# Patient Record
Sex: FEMALE | Race: WHITE | NOT HISPANIC OR LATINO | Employment: FULL TIME | URBAN - METROPOLITAN AREA
[De-identification: names, ages, dates, MRNs, and addresses within clinical notes are randomized per-mention and may not be internally consistent; named-entity substitution may affect disease eponyms.]

---

## 2023-08-22 ENCOUNTER — TELEPHONE (OUTPATIENT)
Dept: OBGYN CLINIC | Facility: MEDICAL CENTER | Age: 33
End: 2023-08-22

## 2023-08-22 ENCOUNTER — TELEPHONE (OUTPATIENT)
Facility: HOSPITAL | Age: 33
End: 2023-08-22

## 2023-08-22 DIAGNOSIS — N92.6 MISSED MENSES: Primary | ICD-10-CM

## 2023-08-22 NOTE — TELEPHONE ENCOUNTER
PT called and LVM at [facility] office. Stated she was transferred from radiology and needed to schedule an appt. Verified PT needed to be scheduled w/radiology. Spoke w/Nithya at central scheduling and she stated she would call PT to schedule.

## 2023-09-06 ENCOUNTER — HOSPITAL ENCOUNTER (OUTPATIENT)
Dept: RADIOLOGY | Age: 33
Discharge: HOME | End: 2023-09-06
Payer: COMMERCIAL

## 2023-09-06 ENCOUNTER — TELEPHONE (OUTPATIENT)
Dept: OBGYN CLINIC | Facility: CLINIC | Age: 33
End: 2023-09-06

## 2023-09-06 DIAGNOSIS — N92.6 MISSED MENSES: ICD-10-CM

## 2023-09-06 DIAGNOSIS — Z34.01 ENCOUNTER FOR SUPERVISION OF NORMAL FIRST PREGNANCY IN FIRST TRIMESTER: Primary | ICD-10-CM

## 2023-09-06 PROCEDURE — 76801 OB US < 14 WKS SINGLE FETUS: CPT

## 2023-09-07 ENCOUNTER — INITIAL PRENATAL (OUTPATIENT)
Dept: OBGYN CLINIC | Facility: CLINIC | Age: 33
End: 2023-09-07

## 2023-09-07 VITALS
WEIGHT: 193.8 LBS | BODY MASS INDEX: 26.25 KG/M2 | DIASTOLIC BLOOD PRESSURE: 62 MMHG | HEIGHT: 72 IN | SYSTOLIC BLOOD PRESSURE: 122 MMHG

## 2023-09-07 DIAGNOSIS — Z34.01 ENCOUNTER FOR SUPERVISION OF NORMAL FIRST PREGNANCY IN FIRST TRIMESTER: Primary | ICD-10-CM

## 2023-09-07 PROCEDURE — OBC

## 2023-09-07 NOTE — PROGRESS NOTES
OB History    Para Term  AB Living   1 0 0 0 0 0   SAB IAB Ectopic Multiple Live Births   0 0 0 0 0      # Outcome Date GA Lbr Erick/2nd Weight Sex Delivery Anes PTL Lv   1 Current                  * Pt presents for OB intake  *  *Pt's LMP was Patient's last menstrual period was 2023. *Ultrasound date:2023   8weeks 6days  *Estimated date of delivery: 2023   * confirmed by lmp    *Signs/Symptoms of Pregnancy   *yes Constipation    *no Headaches   *yes Cramping  *no Spotting   *yes Nausea - mild     Diabetes  If any of the following answers are  yes, please order 1 hour GTT, 50g   *no Hx of GDM    *no BMI >35    *no First degree relative with type 2 diabetes    *no Hx of PCOS   *no Current metformin use    *no Prior hx of LGA/macrosomia    *no AMA with other risk factors    Hypertension-    *no Hx of chronic HTN    *no Hx of gestational HTN   *no hx of preeclampsia, eclampsia, or HELLP syndrome     *Infection Screening   *no Does the pt have a hx of MRSA?    *if yes- follow MRSA protocol and obtain a nasal swab for MRSA culture   *yes History of herpes?      - will need Valtrex at 36 weeks    *Immunizations:   *yes Discussed influenza vaccine     Not flu season   *yes Discussed TDaP vaccine   *no COVID Vaccine     - declines    ACTIVE MEDICAL/MENTAL HEALTH CONDITIONS  Depression *no   Anxiety*no  Medications*no    *Interview education   *Handouts given:    *[facility] and Me support center     *MyChart sign up instructions    *Lab Locations    *[facility] Pediatricians List/Choosing Pediatrician Sheet    * [employer] [facility] Classes    *Schedule for Prenatal Visits    *Pregnancy Warning Signs Reviewed    *Safe Medications During Pregnancy    *SMA and CF Testing information sheet    *CPT Code Sheet/MFM 13week NT pamphlet    *[employer]      SBIRT Screen:  Depression Screening Follow-up Plan: Patient's depression screening was negative with an Dundee score of 0        *[facility]   *yes discussed genetic testing- pt interested     Patient has been informed of basic prenatal advice such as avoiding alcohol, drugs, and smoking. She should remain hydrated and take daily prenatal vitamins. Patient should avoid caffeine, raw sprouts, high mercury fish, undercooked fish, raw eggs, organ meat, unwashed produce, and unpasteurized cheeses, milk, and fruit juice and undercooked meats. She has been informed about toxoplasmosis and to avoid cat feces. *Details that I feel the provider should be aware of:  Tracy Mckeon presented today for initial ob intake at [facility]. She has complaints of mild nausea and constipation. Reviewed medications she can take during pregnancy. Reviewed warning signs and when to call the office. Prenatal labs, TSH ordered. [facility] referral placed. PN1 visit scheduled for *10/05/2023. The patient was oriented to our practice and all questions were answered.     Interviewed by:  Camilo Faulkner RN

## 2023-10-01 DIAGNOSIS — E07.9 DISEASE OF THYROID GLAND: ICD-10-CM

## 2023-10-03 RX ORDER — LEVOTHYROXINE SODIUM 0.07 MG/1
75 TABLET ORAL EVERY MORNING
Qty: 90 TABLET | Refills: 1 | Status: SHIPPED | OUTPATIENT
Start: 2023-10-03

## 2023-10-04 ENCOUNTER — ROUTINE PRENATAL (OUTPATIENT)
Dept: PERINATAL CARE | Facility: CLINIC | Age: 33
End: 2023-10-04
Payer: COMMERCIAL

## 2023-10-04 VITALS
WEIGHT: 197.8 LBS | BODY MASS INDEX: 27.69 KG/M2 | SYSTOLIC BLOOD PRESSURE: 118 MMHG | HEART RATE: 89 BPM | HEIGHT: 71 IN | DIASTOLIC BLOOD PRESSURE: 80 MMHG

## 2023-10-04 DIAGNOSIS — Z3A.12 12 WEEKS GESTATION OF PREGNANCY: ICD-10-CM

## 2023-10-04 DIAGNOSIS — Z34.01 ENCOUNTER FOR SUPERVISION OF NORMAL FIRST PREGNANCY IN FIRST TRIMESTER: ICD-10-CM

## 2023-10-04 DIAGNOSIS — Z36.82 ENCOUNTER FOR (NT) NUCHAL TRANSLUCENCY SCAN: Primary | ICD-10-CM

## 2023-10-04 PROBLEM — U07.1 REINFECTION BY COVID-19 VIRUS: Status: RESOLVED | Noted: 2022-03-08 | Resolved: 2023-10-04

## 2023-10-04 PROBLEM — Z86.16 PERSONAL HISTORY OF COVID-19: Status: RESOLVED | Noted: 2022-01-14 | Resolved: 2023-10-04

## 2023-10-04 PROCEDURE — 76813 OB US NUCHAL MEAS 1 GEST: CPT | Performed by: OBSTETRICS & GYNECOLOGY

## 2023-10-04 PROCEDURE — 99203 OFFICE O/P NEW LOW 30 MIN: CPT | Performed by: OBSTETRICS & GYNECOLOGY

## 2023-10-04 RX ORDER — ASPIRIN 81 MG/1
162 TABLET, CHEWABLE ORAL DAILY
COMMUNITY
End: 2023-10-05 | Stop reason: SDUPTHER

## 2023-10-04 NOTE — PROGRESS NOTES
[address] [facility]: Ms. Ciara Mccormick was seen today at 12w5d for nuchal translucency ultrasound. See ultrasound report under "OB Procedures" tab. My recommendations are as follows:  1. We reviewed the availability of aneuploidy screening, as well as diagnostic testing, which are available to all pregnant women. We reviewed limitations, risks, and benefits of screening and testing. We reviewed the differences between Sequential Screen and NIPS (non-invasive prenatal screening) as well as that insurance coverage and therefore out-of-pocket costs may vary. She elected to proceed with sequential screen part 1. Lab requisition for part 2 will be mailed to her by our office once results for step 1 are received and reviewed. The lab requisition can also be found in her chart under "media" tab in EPIC once step 1 is resulted. She does not wish to pursue diagnostic testing at this time. A detailed anatomic survey as well as transvaginal cervical length screening are recommended between 18-22 weeks gestation. 2. We reviewed her history of hypothyroidism, and management in pregnancy. The goal in pregnancy is to maintain a euthyroid maternal state, which is important for maternal health and fetal development. Thyroid function should be assessed at least each trimester using thyroid stimulating hormone (TSH) levels. After medication dose-adjustments, labs should be repeated every 4-6 weeks until euthyroid. Goal TSH levels in pregnancy recommended by the American Thyroid Association are 0.1-2.5 mIU/L (first trimester), 0.2-3.0 mIU/L (second trimester) and 0.3-3.0 mIU/L (third trimester). Levothyroxine dose should be titrated to achieve these trimester-specific ranges. It is typical for women to require a dose increase of levothyroxine during pregnancy, often with a reduction needed postpartum.   Although women with overt, uncontrolled hypothyroidism are at risk for adverse pregnancy outcomes (including miscarriage, preeclampsia,  birth, placental abruption, and stillbirth), adequate thyroid hormone replacement in pregnancy minimizes the risk of adverse outcomes. For patients who are well-controlled in pregnancy, no additional fetal surveillance is indicated beyond usual prenatal care. 3. The use of low dose aspirin in pregnancy (162mg) is recommended in women with a high risk, or multiple moderate risk factors for preeclampsia. Aspirin therapy should be initiated between 12-28 weeks gestation, and is most effective if started prior to 16 weeks gestation, and stopped by 36 weeks gestation. Low dose aspirin in pregnancy has been shown to reduce the incidence of preeclampsia in women with risk factors, and has been shown to be safe and without significant maternal or fetal risk. In light of her risk factors which include primigravida and hypothyroidism, I recommend initiating aspirin therapy.     Please don't hesitate to contact our office with any concerns or questions.    -Azeem Arrington MD

## 2023-10-04 NOTE — PROGRESS NOTES
Patient chose to have Stepwise Part 1 Screening from [facility]. Instructed patient blood work must be collected no later than 10/11/23    Reviewed [facility] online appointment scheduling availability. Part 1 results will be available in approximately 7-10 business days. Maternal-Fetal Medicine will call patient with results or she can view in her [facility]. Optimal collection for Part 2 is between 16-18 weeks but can be collected up to 22 weeks gestation. Lab slip and instruction letter will be mailed from our office. Patient instructed to take the paper lab slip to lab, this specialty genetic test is not yet in Epic. Patient verbalized understanding of all instructions.

## 2023-10-04 NOTE — LETTER
October 4, 2023     Clarisa Arcos MD  [facility]    Patient: Merari Dunn   YOB: 1990   Date of Visit: 10/4/2023       Dear Dr. Mike Christiansen:    Thank you for referring Merari Dunn to me for evaluation. Below are my notes for this consultation. If you have questions, please do not hesitate to call me. I look forward to following your patient along with you. Sincerely,        Jovon Giles MD        CC: No Recipients    Jovon Giles MD  10/4/2023 10:23 AM  Sign when Signing Visit  [address] [facility]: Ms. Marylin Quach was seen today at 12w5d for nuchal translucency ultrasound. See ultrasound report under "OB Procedures" tab. My recommendations are as follows:  1. We reviewed the availability of aneuploidy screening, as well as diagnostic testing, which are available to all pregnant women. We reviewed limitations, risks, and benefits of screening and testing. We reviewed the differences between Sequential Screen and NIPS (non-invasive prenatal screening) as well as that insurance coverage and therefore out-of-pocket costs may vary. She elected to proceed with sequential screen part 1. Lab requisition for part 2 will be mailed to her by our office once results for step 1 are received and reviewed. The lab requisition can also be found in her chart under "media" tab in EPIC once step 1 is resulted. She does not wish to pursue diagnostic testing at this time. A detailed anatomic survey as well as transvaginal cervical length screening are recommended between 18-22 weeks gestation. 2. We reviewed her history of hypothyroidism, and management in pregnancy. The goal in pregnancy is to maintain a euthyroid maternal state, which is important for maternal health and fetal development. Thyroid function should be assessed at least each trimester using thyroid stimulating hormone (TSH) levels.   After medication dose-adjustments, labs should be repeated every 4-6 weeks until euthyroid. Goal TSH levels in pregnancy recommended by the American Thyroid Association are 0.1-2.5 mIU/L (first trimester), 0.2-3.0 mIU/L (second trimester) and 0.3-3.0 mIU/L (third trimester). Levothyroxine dose should be titrated to achieve these trimester-specific ranges. It is typical for women to require a dose increase of levothyroxine during pregnancy, often with a reduction needed postpartum. Although women with overt, uncontrolled hypothyroidism are at risk for adverse pregnancy outcomes (including miscarriage, preeclampsia,  birth, placental abruption, and stillbirth), adequate thyroid hormone replacement in pregnancy minimizes the risk of adverse outcomes. For patients who are well-controlled in pregnancy, no additional fetal surveillance is indicated beyond usual prenatal care. 3. The use of low dose aspirin in pregnancy (162mg) is recommended in women with a high risk, or multiple moderate risk factors for preeclampsia. Aspirin therapy should be initiated between 12-28 weeks gestation, and is most effective if started prior to 16 weeks gestation, and stopped by 36 weeks gestation. Low dose aspirin in pregnancy has been shown to reduce the incidence of preeclampsia in women with risk factors, and has been shown to be safe and without significant maternal or fetal risk. In light of her risk factors which include primigravida and hypothyroidism, I recommend initiating aspirin therapy.     Please don't hesitate to contact our office with any concerns or questions.    -Leni Reardon MD

## 2023-10-05 ENCOUNTER — APPOINTMENT (OUTPATIENT)
Dept: LAB | Facility: CLINIC | Age: 33
End: 2023-10-05
Payer: COMMERCIAL

## 2023-10-05 ENCOUNTER — INITIAL PRENATAL (OUTPATIENT)
Dept: OBGYN CLINIC | Facility: CLINIC | Age: 33
End: 2023-10-05

## 2023-10-05 VITALS — WEIGHT: 196.4 LBS | SYSTOLIC BLOOD PRESSURE: 106 MMHG | BODY MASS INDEX: 27.39 KG/M2 | DIASTOLIC BLOOD PRESSURE: 82 MMHG

## 2023-10-05 DIAGNOSIS — O99.281 HYPOTHYROIDISM AFFECTING PREGNANCY IN FIRST TRIMESTER: ICD-10-CM

## 2023-10-05 DIAGNOSIS — Z87.42 HISTORY OF ABNORMAL CERVICAL PAP SMEAR: ICD-10-CM

## 2023-10-05 DIAGNOSIS — E03.9 HYPOTHYROIDISM AFFECTING PREGNANCY IN FIRST TRIMESTER: ICD-10-CM

## 2023-10-05 DIAGNOSIS — Z3A.12 12 WEEKS GESTATION OF PREGNANCY: ICD-10-CM

## 2023-10-05 DIAGNOSIS — Z34.01 ENCOUNTER FOR SUPERVISION OF NORMAL FIRST PREGNANCY IN FIRST TRIMESTER: ICD-10-CM

## 2023-10-05 DIAGNOSIS — Z34.91 FIRST TRIMESTER PREGNANCY: Primary | ICD-10-CM

## 2023-10-05 LAB
AMORPH URATE CRY URNS QL MICRO: ABNORMAL
BACTERIA UR QL AUTO: ABNORMAL /HPF
BASOPHILS # BLD AUTO: 0.02 THOUSANDS/ÂΜL (ref 0–0.1)
BASOPHILS NFR BLD AUTO: 0 % (ref 0–1)
BILIRUB UR QL STRIP: NEGATIVE
CLARITY UR: ABNORMAL
COLOR UR: ABNORMAL
EOSINOPHIL # BLD AUTO: 0.04 THOUSAND/ÂΜL (ref 0–0.61)
EOSINOPHIL NFR BLD AUTO: 1 % (ref 0–6)
ERYTHROCYTE [DISTWIDTH] IN BLOOD BY AUTOMATED COUNT: 13.4 % (ref 11.6–15.1)
GLUCOSE UR STRIP-MCNC: NEGATIVE MG/DL
HCT VFR BLD AUTO: 36.6 % (ref 34.8–46.1)
HGB BLD-MCNC: 12.7 G/DL (ref 11.5–15.4)
HGB UR QL STRIP.AUTO: NEGATIVE
IMM GRANULOCYTES # BLD AUTO: 0.04 THOUSAND/UL (ref 0–0.2)
IMM GRANULOCYTES NFR BLD AUTO: 1 % (ref 0–2)
KETONES UR STRIP-MCNC: NEGATIVE MG/DL
LEUKOCYTE ESTERASE UR QL STRIP: NEGATIVE
LYMPHOCYTES # BLD AUTO: 1.5 THOUSANDS/ÂΜL (ref 0.6–4.47)
LYMPHOCYTES NFR BLD AUTO: 22 % (ref 14–44)
MCH RBC QN AUTO: 29.6 PG (ref 26.8–34.3)
MCHC RBC AUTO-ENTMCNC: 34.7 G/DL (ref 31.4–37.4)
MCV RBC AUTO: 85 FL (ref 82–98)
MONOCYTES # BLD AUTO: 0.42 THOUSAND/ÂΜL (ref 0.17–1.22)
MONOCYTES NFR BLD AUTO: 6 % (ref 4–12)
NEUTROPHILS # BLD AUTO: 4.71 THOUSANDS/ÂΜL (ref 1.85–7.62)
NEUTS SEG NFR BLD AUTO: 70 % (ref 43–75)
NITRITE UR QL STRIP: NEGATIVE
NON-SQ EPI CELLS URNS QL MICRO: ABNORMAL /HPF
NRBC BLD AUTO-RTO: 0 /100 WBCS
PH UR STRIP.AUTO: 7.5 [PH]
PLATELET # BLD AUTO: 288 THOUSANDS/UL (ref 149–390)
PMV BLD AUTO: 9.1 FL (ref 8.9–12.7)
PROT UR STRIP-MCNC: ABNORMAL MG/DL
RBC # BLD AUTO: 4.29 MILLION/UL (ref 3.81–5.12)
RBC #/AREA URNS AUTO: ABNORMAL /HPF
RUBV IGG SERPL IA-ACNC: 25.9 IU/ML
SP GR UR STRIP.AUTO: 1.01 (ref 1–1.03)
TSH SERPL DL<=0.05 MIU/L-ACNC: 1.91 UIU/ML (ref 0.45–4.5)
UROBILINOGEN UR STRIP-ACNC: <2 MG/DL
WBC # BLD AUTO: 6.73 THOUSAND/UL (ref 4.31–10.16)
WBC #/AREA URNS AUTO: ABNORMAL /HPF

## 2023-10-05 PROCEDURE — PNV: Performed by: OBSTETRICS & GYNECOLOGY

## 2023-10-05 PROCEDURE — 86762 RUBELLA ANTIBODY: CPT

## 2023-10-05 PROCEDURE — 86706 HEP B SURFACE ANTIBODY: CPT

## 2023-10-05 PROCEDURE — 86780 TREPONEMA PALLIDUM: CPT

## 2023-10-05 PROCEDURE — 87086 URINE CULTURE/COLONY COUNT: CPT

## 2023-10-05 PROCEDURE — 84443 ASSAY THYROID STIM HORMONE: CPT

## 2023-10-05 PROCEDURE — 36415 COLL VENOUS BLD VENIPUNCTURE: CPT

## 2023-10-05 PROCEDURE — 87389 HIV-1 AG W/HIV-1&-2 AB AG IA: CPT

## 2023-10-05 PROCEDURE — 86803 HEPATITIS C AB TEST: CPT

## 2023-10-05 PROCEDURE — 85025 COMPLETE CBC W/AUTO DIFF WBC: CPT

## 2023-10-05 PROCEDURE — 87340 HEPATITIS B SURFACE AG IA: CPT

## 2023-10-05 RX ORDER — ASPIRIN 81 MG/1
162 TABLET, CHEWABLE ORAL DAILY
Qty: 180 TABLET | Refills: 1 | Status: SHIPPED | OUTPATIENT
Start: 2023-10-05

## 2023-10-05 NOTE — PROGRESS NOTES
Assessment  35 y.o. Kevin Brown at [id number] [facility] presenting for initial prenatal visit. Plan  Diagnoses and all orders for this visit:    First trimester pregnancy  12 weeks gestation of pregnancy  - Discussed delivering providers, anticipated PN course/visit schedule  - s/p MFM consult; sequential screen initiated  - Reviewed early pregnancy precautions  - PN panel planned today  - Return in 4wks for prenatal     Hypothyroidism affecting pregnancy in first trimester  - TSH ordered    History of abnormal cervical Pap smear  - Yearly screening next month      ____________________________________________________________        Subjective    Josef Ordoñez is a 35 y.o. Kevin Brown at [address] [facility] who presents for routine prenatal visit. She is without complaint. Occ mild nausea. No vomiting and not requiring meds. No abnormal discharge or vaginal bleeding. Patient notes that this pregnancy was unplanned, but welcomed and desired. She was not using contraception at the time. She reports she is certain of her LMP and that she has regular menses. Does get some intermenstrual spotting. She has has no vaginal bleeding since her LMP. Patient's PMH is complicated by abnormal paps, genital HSV, hypothyroidism. Infrequent HSV outbreaks, none in many years. She denies other hx of STD/STI, denies a hx of TB or close contacts with persons with TB. She has a cousin with T21 on her and her partners family. Denies a family history of inheritable conditions such as physical or intellectual disabilities, birth defects, blood disorders, heart or neural tube defects. She has never had MRSA. She denies recent travel or travel planned in the near future.      Pregnancy Problems:  Patient Active Problem List   Diagnosis   • Hypothyroidism affecting pregnancy   • BMI 26.0-26.9,adult   • 12 weeks gestation of pregnancy       Objective  /82   Wt 89.1 kg (196 lb 6.4 oz)   LMP 07/07/2023   BMI 27.39 kg/m²     FHT: 150 BPM     Physical Exam:  Physical Exam  Constitutional:       General: She is not in acute distress. Appearance: Normal appearance. She is not ill-appearing, toxic-appearing or diaphoretic. Eyes:      General: No scleral icterus. Right eye: No discharge. Left eye: No discharge. Conjunctiva/sclera: Conjunctivae normal.   Cardiovascular:      Rate and Rhythm: Normal rate. Pulmonary:      Effort: Pulmonary effort is normal. No respiratory distress. Abdominal:      General: There is no distension. Palpations: There is no mass. Tenderness: There is no abdominal tenderness. There is no guarding or rebound. Hernia: No hernia is present. Musculoskeletal:         General: No swelling. Skin:     General: Skin is warm and dry. Coloration: Skin is not jaundiced or pale. Findings: No bruising or erythema. Neurological:      Mental Status: She is alert. Psychiatric:         Mood and Affect: Mood normal.         Behavior: Behavior normal.         Thought Content:  Thought content normal.         Judgment: Judgment normal.         Reviewed:  PN panel (pending pt completion)  TSH (pending pt completion)

## 2023-10-06 LAB
BACTERIA UR CULT: NORMAL
HBV SURFACE AB SER-ACNC: <3 MIU/ML
HBV SURFACE AG SER QL: NORMAL
HCV AB SER QL: NORMAL
HIV 1+2 AB+HIV1 P24 AG SERPL QL IA: NORMAL
HIV 2 AB SERPL QL IA: NORMAL
HIV1 AB SERPL QL IA: NORMAL
HIV1 P24 AG SERPL QL IA: NORMAL
TREPONEMA PALLIDUM IGG+IGM AB [PRESENCE] IN SERUM OR PLASMA BY IMMUNOASSAY: NORMAL

## 2023-10-09 LAB
# FETUSES US: 1
AGE: NORMAL
B-HCG ADJ MOM SERPL: 0.87
B-HCG SERPL-ACNC: 61.1 IU/ML
COLLECT DATE: NORMAL
CURRENT SMOKER: NO
DONATED EGG PATIENT QL: NO
FET CRL US.MEAS: 67 MM
FET CRL US.MEAS: NORMAL MM
FET NUCHAL FOLD MOM THICKNESS US.MEAS: 0.89
FET NUCHAL FOLD THICKNESS US.MEAS: 1.4 MM
FET NUCHAL FOLD THICKNESS US.MEAS: NORMAL MM
FET TS 21 RISK FROM MAT AGE: NORMAL
GA CLIN EST: 12.9 WK
GA METHOD: NORMAL
HX OF NTD NARR: NO
HX OF TRISOMY 21 NARR: NO
IDDM PATIENT QL: NO
INTEGRATED SCN PATIENT-IMP: NORMAL
IVF PREGNANCY: NO
PAPP-A MOM SERPL: 0.62
PAPP-A SERPL-MCNC: 484.1 NG/ML
PHYSICIAN NPI: NORMAL
SL AMB NASAL BONE: PRESENT
SL AMB NTQR LOCATION ID#: NORMAL
SL AMB NTQR READING PHYS ID#: NORMAL
SL AMB REFERRING PHYSICIAN NAME: NORMAL
SL AMB REFERRING PHYSICIAN PHONE: NORMAL
SL AMB REPEAT SPECIMEN: NO
SL AMB TWIN B NASAL BONE: NORMAL
SONOGRAPHER NAME: NORMAL
SONOGRAPHER: NORMAL
SONOGRAPHER: NORMAL
TS 18 RISK FETUS: NORMAL
TS 21 RISK FETUS: NORMAL
US DATE: NORMAL
US FETUSES STUDY [IMP]: NORMAL

## 2023-10-11 ENCOUNTER — TELEPHONE (OUTPATIENT)
Facility: HOSPITAL | Age: 33
End: 2023-10-11

## 2023-10-11 NOTE — LETTER
10/11/23  Semaj Wynne  1990    Thank you for completing Part 1 of your [facility] Stepwise Sequential Screen. To obtain a complete test result, please complete blood work for Part 2 Sequential Screen between the weeks of 10/19/23 to 11/1/23 (optimal) however you do have until 12/13/2023. Based on your insurance coverage, please use one of the following [facility] lab locations. The other option is to go to [email or web address].     Call our office for any questions at [phone]        Sincerely,    Dustin Wiggins RN

## 2023-10-11 NOTE — TELEPHONE ENCOUNTER
----- Message from Buzz Pizarro MD sent at 10/10/2023 10:56 AM EDT -----  [facility] RN staff, I've reviewed this Sequential Part 1 result which is low-risk. I sent her a [employer] results message, can you confirm she has reviewed it, and instruct her on Step 2? Thank you.   Buzz Pizarro MD

## 2023-10-11 NOTE — TELEPHONE ENCOUNTER
Left M for pt with the results of part 1 Stepwise Sequential Screen. PT instructed on part 2 and provided with the eligible date ranges. PT encouraged to contact office with questions. TRF and lab letter mailed to pt.

## 2023-10-30 LAB
# FETUSES US: 1
AFP ADJ MOM SERPL: 1.16
AFP SERPL-MCNC: 31.8 NG/ML
B-HCG ADJ MOM SERPL: 0.93
B-HCG SERPL-ACNC: 32.6 IU/ML
COLLECT DATE: NORMAL
CURRENT SMOKER: NO
FET CRL US.MEAS: 67 MM
FET NUCHAL FOLD MOM THICKNESS US.MEAS: 0.89
FET NUCHAL FOLD THICKNESS US.MEAS: 1.4 MM
FET TS 21 RISK FROM MAT AGE: NORMAL
GA CLIN EST: 15.7 WK
HX OF NTD NARR: NORMAL
IDDM PATIENT QL: NORMAL
INHIBIN A ADJ MOM SERPL: 0.54
INHIBIN A SERPL-MCNC: 80 PG/ML
INTEGRATED SCN PATIENT-IMP: NORMAL
IVF PREGNANCY: NO
NEURAL TUBE DEFECT RISK FETUS: NORMAL %
PAPP-A MOM SERPL: 0.62
PAPP-A SERPL-MCNC: 484.1 NG/ML
PHYSICIAN NPI: NORMAL
SL AMB NASAL BONE: PRESENT
SL AMB REFERRING PHYSICIAN NAME: NORMAL
SL AMB REFERRING PHYSICIAN PHONE: NORMAL
SL AMB REPEAT SPECIMEN: NORMAL
SL AMB SPECIMEN # FROM PART 1: NORMAL
SL AMB TWIN B NASAL BONE: NORMAL
TS 18 RISK FETUS: NORMAL
TS 21 RISK FETUS: NORMAL
U ESTRIOL ADJ MOM SERPL: 1.16
U ESTRIOL SERPL-MCNC: 0.72 NG/ML
US DATE: NORMAL

## 2023-10-31 ENCOUNTER — ROUTINE PRENATAL (OUTPATIENT)
Dept: OBGYN CLINIC | Facility: CLINIC | Age: 33
End: 2023-10-31

## 2023-10-31 VITALS — BODY MASS INDEX: 27.34 KG/M2 | WEIGHT: 196 LBS | DIASTOLIC BLOOD PRESSURE: 74 MMHG | SYSTOLIC BLOOD PRESSURE: 118 MMHG

## 2023-10-31 DIAGNOSIS — E03.9 HYPOTHYROIDISM AFFECTING PREGNANCY IN SECOND TRIMESTER: Primary | ICD-10-CM

## 2023-10-31 DIAGNOSIS — O99.282 HYPOTHYROIDISM AFFECTING PREGNANCY IN SECOND TRIMESTER: Primary | ICD-10-CM

## 2023-10-31 DIAGNOSIS — Z3A.16 16 WEEKS GESTATION OF PREGNANCY: ICD-10-CM

## 2023-10-31 PROCEDURE — PNV: Performed by: STUDENT IN AN ORGANIZED HEALTH CARE EDUCATION/TRAINING PROGRAM

## 2023-10-31 NOTE — PROGRESS NOTES
Routine Prenatal Visit  [facility] [address] [place] [address]    Assessment/Plan:  Amanda Campbell is a 35y.o. year old  at 16w4d who presents for routine prenatal visit. 1. Hypothyroidism affecting pregnancy in second trimester    2. 16 weeks gestation of pregnancy  Assessment & Plan:  - Reviewed normal sequential screen  - Has Level 2 scheduled  - Return in 4 weeks            Subjective:     CC: Prenatal care    Paul Boo is a 35 y.o.  female who presents for routine prenatal care at 16w4d. Pregnancy ROS: Denies leakage of fluid, pelvic pain, or vaginal bleeding. The following portions of the patient's history were reviewed and updated as appropriate: allergies, current medications, past family history, past medical history, obstetric history, gynecologic history, past social history, past surgical history and problem list.      Objective:  /74   Wt 88.9 kg (196 lb)   LMP 2023   BMI 27.34 kg/m²   Pregravid Weight/BMI: Pregravid weight not on file (BMI Could not be calculated)  Current Weight: 88.9 kg (196 lb)   Total Weight Gain: Not found. Pre-Michael Vitals      Flowsheet Row Most Recent Value   Prenatal Assessment    Fetal Heart Rate 142   Movement Present   Prenatal Vitals    Blood Pressure 118/74   Weight - Scale 88.9 kg (196 lb)   Urine Albumin/Glucose    Dilation/Effacement/Station    Vaginal Drainage    Draining Fluid No   Edema    LLE Edema Trace   RLE Edema Trace   Facial Edema None             General: Well appearing, no distress  Respiratory: Unlabored breathing  Cardiovascular: Regular rate. Abdomen: Soft, gravid, nontender  Fundal Height: Appropriate for gestational age. Extremities: Warm and well perfused. Non tender.

## 2023-11-30 ENCOUNTER — ROUTINE PRENATAL (OUTPATIENT)
Dept: PERINATAL CARE | Facility: CLINIC | Age: 33
End: 2023-11-30
Payer: COMMERCIAL

## 2023-11-30 VITALS
BODY MASS INDEX: 28.59 KG/M2 | HEIGHT: 71 IN | DIASTOLIC BLOOD PRESSURE: 74 MMHG | WEIGHT: 204.2 LBS | SYSTOLIC BLOOD PRESSURE: 112 MMHG | HEART RATE: 96 BPM

## 2023-11-30 DIAGNOSIS — O99.282 HYPOTHYROIDISM AFFECTING PREGNANCY IN SECOND TRIMESTER: ICD-10-CM

## 2023-11-30 DIAGNOSIS — E03.9 HYPOTHYROIDISM AFFECTING PREGNANCY IN SECOND TRIMESTER: ICD-10-CM

## 2023-11-30 DIAGNOSIS — Z3A.20 20 WEEKS GESTATION OF PREGNANCY: ICD-10-CM

## 2023-11-30 DIAGNOSIS — Z36.86 ENCOUNTER FOR ANTENATAL SCREENING FOR CERVICAL LENGTH: ICD-10-CM

## 2023-11-30 DIAGNOSIS — Z36.3 ENCOUNTER FOR ANTENATAL SCREENING FOR MALFORMATION: Primary | ICD-10-CM

## 2023-11-30 PROCEDURE — 76817 TRANSVAGINAL US OBSTETRIC: CPT | Performed by: STUDENT IN AN ORGANIZED HEALTH CARE EDUCATION/TRAINING PROGRAM

## 2023-11-30 PROCEDURE — 76805 OB US >/= 14 WKS SNGL FETUS: CPT | Performed by: STUDENT IN AN ORGANIZED HEALTH CARE EDUCATION/TRAINING PROGRAM

## 2023-11-30 PROCEDURE — 99213 OFFICE O/P EST LOW 20 MIN: CPT | Performed by: STUDENT IN AN ORGANIZED HEALTH CARE EDUCATION/TRAINING PROGRAM

## 2023-11-30 NOTE — PROGRESS NOTES
[id number]  [facility]: Ms. Leilani Luis was seen today for anatomic survey and cervical length screening ultrasound. See ultrasound report under "OB Procedures" tab. The time spent on this established patient on the encounter date included 5 minutes previsit service time reviewing records and precharting, 5 minutes face-to-face service time counseling regarding results and coordinating care, and  5 minutes charting, totalling 15 minutes. Please don't hesitate to contact our office with any concerns or questions.   -Valencia Moreira MD

## 2023-12-01 ENCOUNTER — ROUTINE PRENATAL (OUTPATIENT)
Dept: OBGYN CLINIC | Facility: MEDICAL CENTER | Age: 33
End: 2023-12-01

## 2023-12-01 VITALS — BODY MASS INDEX: 28.45 KG/M2 | DIASTOLIC BLOOD PRESSURE: 70 MMHG | SYSTOLIC BLOOD PRESSURE: 114 MMHG | WEIGHT: 204 LBS

## 2023-12-01 DIAGNOSIS — E03.9 HYPOTHYROIDISM AFFECTING PREGNANCY IN SECOND TRIMESTER: Primary | ICD-10-CM

## 2023-12-01 DIAGNOSIS — O99.282 HYPOTHYROIDISM AFFECTING PREGNANCY IN SECOND TRIMESTER: Primary | ICD-10-CM

## 2023-12-01 DIAGNOSIS — Z3A.21 21 WEEKS GESTATION OF PREGNANCY: ICD-10-CM

## 2023-12-01 PROCEDURE — PNV: Performed by: NURSE PRACTITIONER

## 2023-12-01 NOTE — PROGRESS NOTES
Denies loss of fluid, vaginal bleeding and abdominal pain. Confirms fetal movement. Tolerating PNV, levothyroxine and LDASA well. Reviewed recommendation for flu vaccine, patient declines. [address] US reviewed -presentation, normal-appearing fetal growth, anterior placenta, no placenta previa, SELMA-WNL and EFW 1 pound 2 ounce. Recommendation for follow-up in 10 to 12 weeks for growth. /70 Wt + 9lb   Plan:   -Continue PNV and LDASA daily   -follow up with [address] 2/14/23  - decline flu vaccine   - common discomforts of pregnancy and precautions reviewed.  Signs and symptoms to report reviewed   RTO 4 weeks

## 2024-01-04 ENCOUNTER — ROUTINE PRENATAL (OUTPATIENT)
Dept: OBGYN CLINIC | Facility: MEDICAL CENTER | Age: 34
End: 2024-01-04

## 2024-01-04 VITALS — WEIGHT: 210.9 LBS | SYSTOLIC BLOOD PRESSURE: 108 MMHG | DIASTOLIC BLOOD PRESSURE: 72 MMHG | BODY MASS INDEX: 29.41 KG/M2

## 2024-01-04 DIAGNOSIS — E03.9 HYPOTHYROIDISM AFFECTING PREGNANCY IN SECOND TRIMESTER: ICD-10-CM

## 2024-01-04 DIAGNOSIS — Z3A.25 25 WEEKS GESTATION OF PREGNANCY: ICD-10-CM

## 2024-01-04 DIAGNOSIS — O99.282 HYPOTHYROIDISM AFFECTING PREGNANCY IN SECOND TRIMESTER: ICD-10-CM

## 2024-01-04 DIAGNOSIS — Z34.92 SECOND TRIMESTER PREGNANCY: Primary | ICD-10-CM

## 2024-01-04 PROCEDURE — PNV: Performed by: STUDENT IN AN ORGANIZED HEALTH CARE EDUCATION/TRAINING PROGRAM

## 2024-01-04 NOTE — PROGRESS NOTES
Routine Prenatal Visit  [facility] [address], [place], [place]    Assessment/Plan:  Suyapa is a 33 y.o. year old  at 25w6d who presents for routine prenatal visit.     1. Second trimester pregnancy  -     CBC and differential; Future  -     Anemia Panel w/Reflex, OB; Future  -     Glucose, 1H PG; Future  -     RPR-Syphilis Screening (Total Syphilis IGG/IGM); Future    2. 25 weeks gestation of pregnancy  -     CBC and differential; Future  -     Anemia Panel w/Reflex, OB; Future  -     Glucose, 1H PG; Future  -     RPR-Syphilis Screening (Total Syphilis IGG/IGM); Future    3. Hypothyroidism affecting pregnancy in second trimester  -     TSH, 3rd generation with Free T4 reflex; Future          Subjective:     CC: Prenatal care    Suyapa Mann is a 33 y.o.  female who presents for routine prenatal care at 25w6d.  Pregnancy ROS: Denies leakage of fluid, pelvic pain, or vaginal bleeding.  Reports normal fetal movement.    The following portions of the patient's history were reviewed and updated as appropriate: allergies, current medications, past family history, past medical history, obstetric history, gynecologic history, past social history, past surgical history and problem list.      Objective:  /72   Wt 95.7 kg (210 lb 14.4 oz)   LMP 2023   BMI 29.41 kg/m²   Pregravid Weight/BMI: 88.5 kg (195 lb) (BMI 27.21)  Current Weight: 95.7 kg (210 lb 14.4 oz)   Total Weight Gain: 7.212 kg (15 lb 14.4 oz)   Pre-Michael Vitals      Flowsheet Row Most Recent Value   Prenatal Assessment    Fetal Heart Rate 150   Movement Present   Prenatal Vitals    Blood Pressure 108/72   Weight - Scale 95.7 kg (210 lb 14.4 oz)   Urine Albumin/Glucose    Dilation/Effacement/Station    Vaginal Drainage    Edema    LLE Edema None   RLE Edema None             General: Well appearing, no distress  Respiratory: Unlabored breathing  Cardiovascular: Regular rate.  Abdomen: Soft, gravid,  nontender  Fundal Height: Appropriate for gestational age.  Extremities: Warm and well perfused.  Non tender.

## 2024-01-17 ENCOUNTER — TELEPHONE (OUTPATIENT)
Dept: OBGYN CLINIC | Facility: MEDICAL CENTER | Age: 34
End: 2024-01-17

## 2024-01-17 NOTE — TELEPHONE ENCOUNTER
Pt called requesting thyroid labs to be sent to [facility]. Labs were faxed to [phone] per pt's request.

## 2024-01-18 LAB
BASOPHILS # BLD AUTO: 24 CELLS/UL (ref 0–200)
BASOPHILS NFR BLD AUTO: 0.3 %
EOSINOPHIL # BLD AUTO: 63 CELLS/UL (ref 15–500)
EOSINOPHIL NFR BLD AUTO: 0.8 %
ERYTHROCYTE [DISTWIDTH] IN BLOOD BY AUTOMATED COUNT: 12.5 % (ref 11–15)
GLUCOSE 1H P 50 G GLC PO SERPL-MCNC: 75 MG/DL
HCT VFR BLD AUTO: 34.1 % (ref 35–45)
HGB BLD-MCNC: 11.7 G/DL (ref 11.7–15.5)
IRON SATN MFR SERPL: 25 % (CALC) (ref 16–45)
IRON SERPL-MCNC: 97 MCG/DL (ref 40–190)
LYMPHOCYTES # BLD AUTO: 1390 CELLS/UL (ref 850–3900)
LYMPHOCYTES NFR BLD AUTO: 17.6 %
MCH RBC QN AUTO: 30.8 PG (ref 27–33)
MCHC RBC AUTO-ENTMCNC: 34.3 G/DL (ref 32–36)
MCV RBC AUTO: 89.7 FL (ref 80–100)
MONOCYTES # BLD AUTO: 435 CELLS/UL (ref 200–950)
MONOCYTES NFR BLD AUTO: 5.5 %
NEUTROPHILS # BLD AUTO: 5988 CELLS/UL (ref 1500–7800)
NEUTROPHILS NFR BLD AUTO: 75.8 %
PLATELET # BLD AUTO: 271 THOUSAND/UL (ref 140–400)
PMV BLD REES-ECKER: 8.8 FL (ref 7.5–12.5)
RBC # BLD AUTO: 3.8 MILLION/UL (ref 3.8–5.1)
RPR SER QL: NORMAL
TIBC SERPL-MCNC: 395 MCG/DL (CALC) (ref 250–450)
TSH SERPL-ACNC: 1.75 MIU/L
WBC # BLD AUTO: 7.9 THOUSAND/UL (ref 3.8–10.8)

## 2024-01-23 ENCOUNTER — ROUTINE PRENATAL (OUTPATIENT)
Dept: OBGYN CLINIC | Facility: CLINIC | Age: 34
End: 2024-01-23
Payer: COMMERCIAL

## 2024-01-23 VITALS — SYSTOLIC BLOOD PRESSURE: 124 MMHG | BODY MASS INDEX: 29.71 KG/M2 | DIASTOLIC BLOOD PRESSURE: 74 MMHG | WEIGHT: 213 LBS

## 2024-01-23 DIAGNOSIS — E03.9 HYPOTHYROIDISM AFFECTING PREGNANCY IN THIRD TRIMESTER: Primary | ICD-10-CM

## 2024-01-23 DIAGNOSIS — Z3A.28 28 WEEKS GESTATION OF PREGNANCY: ICD-10-CM

## 2024-01-23 DIAGNOSIS — Z23 ENCOUNTER FOR IMMUNIZATION: ICD-10-CM

## 2024-01-23 DIAGNOSIS — O99.283 HYPOTHYROIDISM AFFECTING PREGNANCY IN THIRD TRIMESTER: Primary | ICD-10-CM

## 2024-01-23 PROCEDURE — PNV: Performed by: STUDENT IN AN ORGANIZED HEALTH CARE EDUCATION/TRAINING PROGRAM

## 2024-01-23 PROCEDURE — 90715 TDAP VACCINE 7 YRS/> IM: CPT | Performed by: STUDENT IN AN ORGANIZED HEALTH CARE EDUCATION/TRAINING PROGRAM

## 2024-01-23 PROCEDURE — 90471 IMMUNIZATION ADMIN: CPT | Performed by: STUDENT IN AN ORGANIZED HEALTH CARE EDUCATION/TRAINING PROGRAM

## 2024-01-23 NOTE — PROGRESS NOTES
Routine Prenatal Visit  [facility] [address] [place]    Assessment/Plan:  Suyapa is a 33 y.o. year old  at 28w4d who presents for routine prenatal visit.     1. Hypothyroidism affecting pregnancy in third trimester  Assessment & Plan:  - Reviewed 2024 TSH - 1.74- at goal      2. 28 weeks gestation of pregnancy  Assessment & Plan:  - Glucose tolerance test and CBC reviewed  - Delivery consent discussed in detail and signed in the office today  - [facility], list of pediatric providers, and fetal kick count education  - Breastfeeding education was given and a breast pump was ordered  - Tdap given  - Blood type A positive, rhogam not indicated            Subjective:     CC: Prenatal care    Suyapa Mann is a 33 y.o.  female who presents for routine prenatal care at 28w4d.  Pregnancy ROS: Denies leakage of fluid, pelvic pain, or vaginal bleeding.  Reports fetal movement.    The following portions of the patient's history were reviewed and updated as appropriate: allergies, current medications, past family history, past medical history, obstetric history, gynecologic history, past social history, past surgical history and problem list.      Objective:  /74   Wt 96.6 kg (213 lb)   LMP 2023   BMI 29.71 kg/m²   Pregravid Weight/BMI: 88.5 kg (195 lb) (BMI 27.21)  Current Weight: 96.6 kg (213 lb)   Total Weight Gain: 8.165 kg (18 lb)   Pre-Michael Vitals      Flowsheet Row Most Recent Value   Prenatal Assessment    Fetal Heart Rate 142   Movement Present   Prenatal Vitals    Blood Pressure 124/74   Weight - Scale 96.6 kg (213 lb)   Urine Albumin/Glucose    Dilation/Effacement/Station    Vaginal Drainage    Draining Fluid No   Edema    LLE Edema None   RLE Edema None   Facial Edema None             General: Well appearing, no distress  Respiratory: Unlabored breathing  Cardiovascular: Regular rate.  Abdomen: Soft, gravid,  nontender  Fundal Height: Appropriate for gestational age.  Extremities: Warm and well perfused.  Non tender.

## 2024-01-23 NOTE — ASSESSMENT & PLAN NOTE
- Glucose tolerance test and CBC reviewed  - Delivery consent discussed in detail and signed in the office today  - Patient was given [facility] Birth Plan Form, list of pediatric providers, and fetal kick count education  - Breastfeeding education was given and a breast pump was ordered  - Tdap given  - Blood type A positive, rhogam not indicated

## 2024-01-27 LAB
DME PARACHUTE DELIVERY DATE ACTUAL: NORMAL
DME PARACHUTE DELIVERY DATE REQUESTED: NORMAL
DME PARACHUTE ITEM DESCRIPTION: NORMAL
DME PARACHUTE ORDER STATUS: NORMAL
DME PARACHUTE SUPPLIER NAME: NORMAL
DME PARACHUTE SUPPLIER PHONE: NORMAL

## 2024-02-06 ENCOUNTER — ROUTINE PRENATAL (OUTPATIENT)
Dept: OBGYN CLINIC | Facility: CLINIC | Age: 34
End: 2024-02-06

## 2024-02-06 VITALS
BODY MASS INDEX: 29.96 KG/M2 | SYSTOLIC BLOOD PRESSURE: 128 MMHG | HEIGHT: 71 IN | DIASTOLIC BLOOD PRESSURE: 74 MMHG | WEIGHT: 214 LBS

## 2024-02-06 DIAGNOSIS — O99.283 HYPOTHYROIDISM AFFECTING PREGNANCY IN THIRD TRIMESTER: Primary | ICD-10-CM

## 2024-02-06 DIAGNOSIS — Z3A.30 30 WEEKS GESTATION OF PREGNANCY: ICD-10-CM

## 2024-02-06 DIAGNOSIS — E03.9 HYPOTHYROIDISM AFFECTING PREGNANCY IN THIRD TRIMESTER: Primary | ICD-10-CM

## 2024-02-06 PROCEDURE — PNV: Performed by: STUDENT IN AN ORGANIZED HEALTH CARE EDUCATION/TRAINING PROGRAM

## 2024-02-06 NOTE — PROGRESS NOTES
"Routine Prenatal Visit  [facility] [address] [place]    Assessment/Plan:  Suyapa is a 33 y.o. year old  at 30w4d who presents for routine prenatal visit.     1. Hypothyroidism affecting pregnancy in third trimester    2. 30 weeks gestation of pregnancy          Subjective:     CC: Prenatal care    Suyapa Mann is a 33 y.o.  female who presents for routine prenatal care at 30w4d.  Pregnancy ROS: Denies leakage of fluid, pelvic pain, or vaginal bleeding.  Reports normal fetal movement.    The following portions of the patient's history were reviewed and updated as appropriate: allergies, current medications, past family history, past medical history, obstetric history, gynecologic history, past social history, past surgical history and problem list.      Objective:  /74   Ht 5' 11\" (1.803 m)   Wt 97.1 kg (214 lb)   LMP 2023   BMI 29.85 kg/m²   Pregravid Weight/BMI: 88.5 kg (195 lb) (BMI 27.21)  Current Weight: 97.1 kg (214 lb)   Total Weight Gain: 8.618 kg (19 lb)   Pre-Michael Vitals      Flowsheet Row Most Recent Value   Prenatal Assessment    Fetal Heart Rate 138   Movement Present   Prenatal Vitals    Blood Pressure 128/74   Weight - Scale 97.1 kg (214 lb)   Urine Albumin/Glucose    Dilation/Effacement/Station    Vaginal Drainage    Draining Fluid No   Edema    LLE Edema None   RLE Edema None   Facial Edema None             General: Well appearing, no distress  Respiratory: Unlabored breathing  Cardiovascular: Regular rate.  Abdomen: Soft, gravid, nontender  Fundal Height: Appropriate for gestational age.  Extremities: Warm and well perfused.  Non tender.  "

## 2024-02-14 ENCOUNTER — ULTRASOUND (OUTPATIENT)
Dept: PERINATAL CARE | Facility: CLINIC | Age: 34
End: 2024-02-14
Payer: COMMERCIAL

## 2024-02-14 VITALS
WEIGHT: 216.2 LBS | DIASTOLIC BLOOD PRESSURE: 68 MMHG | HEART RATE: 98 BPM | BODY MASS INDEX: 30.27 KG/M2 | HEIGHT: 71 IN | SYSTOLIC BLOOD PRESSURE: 132 MMHG

## 2024-02-14 DIAGNOSIS — E03.9 HYPOTHYROIDISM AFFECTING PREGNANCY IN THIRD TRIMESTER: Primary | ICD-10-CM

## 2024-02-14 DIAGNOSIS — Z3A.31 31 WEEKS GESTATION OF PREGNANCY: ICD-10-CM

## 2024-02-14 DIAGNOSIS — O99.283 HYPOTHYROIDISM AFFECTING PREGNANCY IN THIRD TRIMESTER: Primary | ICD-10-CM

## 2024-02-14 DIAGNOSIS — Z36.89 ENCOUNTER FOR ULTRASOUND TO ASSESS FETAL GROWTH: ICD-10-CM

## 2024-02-14 DIAGNOSIS — Z36.2 ENCOUNTER FOR FOLLOW-UP ULTRASOUND OF FETAL ANATOMY: ICD-10-CM

## 2024-02-14 PROCEDURE — 76816 OB US FOLLOW-UP PER FETUS: CPT | Performed by: OBSTETRICS & GYNECOLOGY

## 2024-02-14 PROCEDURE — 99213 OFFICE O/P EST LOW 20 MIN: CPT | Performed by: OBSTETRICS & GYNECOLOGY

## 2024-02-14 NOTE — PROGRESS NOTES
"[facility]: Suyapa Mann was seen today at 31w5d for fetal growth and followup missed anatomy ultrasound.  See ultrasound report under \"OB Procedures\" tab.  Please don't hesitate to contact our office with any concerns or questions.  -Nimco Villanueva MD    "

## 2024-02-14 NOTE — LETTER
"2024     Ana Castellon MD  [address] [place] [address]  [address] [place] [address]    Patient: Suyapa aMnn   YOB: 1990   Date of Visit: 2024       Dear Dr. Castellon:    Thank you for referring Suyapa Mann to me for evaluation. Below are my notes for this consultation.    If you have questions, please do not hesitate to call me. I look forward to following your patient along with you.         Sincerely,        Nimco Villanueva MD        CC: No Recipients    Nimco Villanueva MD  2024  3:05 PM  Sign when Signing Visit  [facility]: Suyapa Mann was seen today at 31w5d for fetal growth and followup missed anatomy ultrasound.  See ultrasound report under \"OB Procedures\" tab.  Please don't hesitate to contact our office with any concerns or questions.  -Nimco Villanueva MD    "

## 2024-02-17 ENCOUNTER — CLINICAL SUPPORT (OUTPATIENT)
Age: 34
End: 2024-02-17

## 2024-02-17 DIAGNOSIS — Z32.2 ENCOUNTER FOR CHILDBIRTH INSTRUCTION: Primary | ICD-10-CM

## 2024-02-18 NOTE — PROGRESS NOTES
[facility]

## 2024-02-22 ENCOUNTER — ROUTINE PRENATAL (OUTPATIENT)
Dept: OBGYN CLINIC | Facility: CLINIC | Age: 34
End: 2024-02-22

## 2024-02-22 VITALS — WEIGHT: 216.2 LBS | DIASTOLIC BLOOD PRESSURE: 70 MMHG | SYSTOLIC BLOOD PRESSURE: 100 MMHG | BODY MASS INDEX: 30.15 KG/M2

## 2024-02-22 DIAGNOSIS — O99.283 HYPOTHYROIDISM AFFECTING PREGNANCY IN THIRD TRIMESTER: ICD-10-CM

## 2024-02-22 DIAGNOSIS — Z34.03 ENCOUNTER FOR SUPERVISION OF NORMAL FIRST PREGNANCY IN THIRD TRIMESTER: Primary | ICD-10-CM

## 2024-02-22 DIAGNOSIS — E03.9 HYPOTHYROIDISM AFFECTING PREGNANCY IN THIRD TRIMESTER: ICD-10-CM

## 2024-02-22 PROBLEM — Z3A.32 32 WEEKS GESTATION OF PREGNANCY: Status: ACTIVE | Noted: 2023-10-04

## 2024-02-22 PROCEDURE — PNV: Performed by: OBSTETRICS & GYNECOLOGY

## 2024-02-22 NOTE — PROGRESS NOTES
Routine Prenatal Visit  [facility] [address] [place]    Assessment/Plan:  Suyapa is a 33 y.o. year old  at 32w6d who presents for routine prenatal visit.     1. Encounter for supervision of normal first pregnancy in third trimester    2. Hypothyroidism affecting pregnancy in third trimester          Subjective:     CC: Prenatal care    Suyapa Mann is a 33 y.o.  female who presents for routine prenatal care at 32w6d.  Pregnancy ROS: no leakage of fluid, pelvic pain, or vaginal bleeding.  good fetal movement.  Will provide birth plan at next visit, still working on it.     The following portions of the patient's history were reviewed and updated as appropriate: allergies, current medications, past family history, past medical history, obstetric history, gynecologic history, past social history, past surgical history and problem list.      Objective:  /70   Wt 98.1 kg (216 lb 3.2 oz)   LMP 2023   BMI 30.15 kg/m²   Pregravid Weight/BMI: 88.5 kg (195 lb) (BMI 27.21)  Current Weight: 98.1 kg (216 lb 3.2 oz)   Total Weight Gain: 9.616 kg (21 lb 3.2 oz)   Pre- Vitals      Flowsheet Row Most Recent Value   Prenatal Assessment    Fetal Heart Rate 142   Movement Present   Prenatal Vitals    Blood Pressure 100/70   Weight - Scale 98.1 kg (216 lb 3.2 oz)   Urine Albumin/Glucose    Dilation/Effacement/Station    Vaginal Drainage    Draining Fluid No   Edema    LLE Edema None             General: Well appearing, no distress  Respiratory: Unlabored breathing  Cardiovascular: Regular rate.  Abdomen: Soft, gravid, nontender  Fundal Height: Appropriate for gestational age.  Extremities: Warm and well perfused.  Non tender.

## 2024-02-22 NOTE — PATIENT INSTRUCTIONS
Fetal Movement   WHAT YOU NEED TO KNOW:   Fetal movements are the kicks, rolls, and hiccups of your unborn baby. You may start to feel these movements when you are 20 weeks pregnant. The movements grow stronger and more frequent as your baby grows. Fetal movements show that your unborn baby is getting the oxygen and nutrients he or she needs before birth. Fewer fetal movements may signal a problem with your baby's health.  DISCHARGE INSTRUCTIONS:   Follow up with your doctor or obstetrician as directed:  Write down your questions so you remember to ask them during your visits.  Normal fetal movement:  Fetal activity can be described by 4 states, from least to most active. During quiet sleep, your unborn baby may be still for up to 2 hours. During active sleep, he or she kicks, rolls, and moves often. During the quiet awake state, he or she may only move his or her eyes. The active awake state includes strong kicks and rolls.  What affects fetal movement:  You may feel your baby move more after you eat, or after you drink caffeine. You may feel your baby move less while you are more active, such as when you exercise. You may also feel fewer movements if you are obese. Certain medicines can change your baby's movements. Tell your healthcare provider about the medicines you are taking.  Track fetal movements at home:  Fetal movement is most often felt when you lie quietly on your side. Your healthcare provider may ask you to count movements for 2 hours. He or she may ask you to track how long it takes for your baby to move 10 times. Keep a log of your baby's movements.  Contact your doctor or obstetrician if:   It takes longer than usual to feel 10 of your unborn baby's movements.    You do not feel your unborn baby move at least 10 times in 2 hours.    The skin on your hands, feet, and around your eyes is more swollen than usual.    You have a headache for at least 24 hours.    Tiny red dots appear on your  skin.    Your belly is tender when you press on it.    You have questions or concerns about your condition or care.    Return to the emergency department if:   You do not feel your unborn baby move for 12 hours.    You feel cramping or constant pain in your abdomen.    You have heavy bleeding from your vagina.    You have a severe headache and cannot see clearly.    You are having trouble breathing or are vomiting.    You have a seizure.    © Copyright [employer] 2023 Information is for End User's use only and may not be sold, redistributed or otherwise used for commercial purposes.  The above information is an  only. It is not intended as medical advice for individual conditions or treatments. Talk to your doctor, nurse or pharmacist before following any medical regimen to see if it is safe and effective for you.  Early Labor Signs   WHAT YOU NEED TO KNOW:   Early labor signs can happen weeks, days, or hours before your baby is ready to be delivered. You may have false labor signs, which are also called Andrzej Holland contractions. False labor is common and may happen several weeks or days before your actual labor. The contractions are not regular, and do not get closer together. The pain is usually mild, does not worsen, and is felt only in front. Andrzej Holland contractions may happen later in the day, and stop after you change position, walk, or rest.  DISCHARGE INSTRUCTIONS:   Call 911 for any of the following:   You have heavy vaginal bleeding.    You cannot get to the hospital before the baby starts to come out.    Return to the emergency department if:   You have regular, painful contractions that are less than 5 minutes apart and last 30 to 70 seconds each.    You have a constant trickle or sudden gush of clear fluid from your vagina.    You notice a sudden decrease in your baby's movement.    Contact your obstetrician or healthcare provider if:   You have pain in your lower back or abdomen that  does not get better when you change positions.    You have bloody mucus or show.    You have questions or concerns about your condition or care.    Early Labor signs and symptoms:   Lightening  occurs when your baby drops inside your pelvis. You may feel increased pressure in your pelvis. This may happen a few weeks to a few hours before your labor begins.    Contractions  are cramps and tightening that occur in your uterus to help move the baby through your birth canal. Contractions occur regularly and more often each time. Each one lasts about 30 to 70 seconds, and gets stronger until you deliver your baby. Contractions do not go away with movement. The pain usually starts in your lower back and moves to your abdomen.     Effacement  occurs when your cervix softens and thins, so it can easily open for the baby. You will not be able to feel effacement. Your healthcare provider will examine your cervix for effacement.     Dilation  is widening of your cervix. Your healthcare provider will examine your cervix for dilation. Your cervix may start to dilate weeks before your baby is delivered. Your cervix will be fully opened and ready for delivery when it is dilated to 10 centimeters.     Increased discharge  from your vagina may occur. It may be brown, pink, clear, or slightly bloody. This discharge may also be called bloody show. Bloody show is a mucus plug that forms and blocks your cervix during pregnancy. The discharge may mean that your cervix is opening up and getting ready for delivery.    Rupture of membranes  is a sudden release of clear fluid from your vagina. Ruptured membranes means your water broke. Your healthcare provider may need to break your water if it does not happen on its own.    © Copyright [employer] 2023 Information is for End User's use only and may not be sold, redistributed or otherwise used for commercial purposes.  The above information is an  only. It is not intended as  medical advice for individual conditions or treatments. Talk to your doctor, nurse or pharmacist before following any medical regimen to see if it is safe and effective for you.

## 2024-02-26 ENCOUNTER — CLINICAL SUPPORT (OUTPATIENT)
Age: 34
End: 2024-02-26

## 2024-02-26 DIAGNOSIS — Z32.2 ENCOUNTER FOR CHILDBIRTH INSTRUCTION: Primary | ICD-10-CM

## 2024-02-29 ENCOUNTER — TELEPHONE (OUTPATIENT)
Dept: FAMILY MEDICINE CLINIC | Facility: CLINIC | Age: 34
End: 2024-02-29

## 2024-02-29 DIAGNOSIS — E07.9 DISEASE OF THYROID GLAND: ICD-10-CM

## 2024-02-29 RX ORDER — LEVOTHYROXINE SODIUM 0.07 MG/1
75 TABLET ORAL EVERY MORNING
Qty: 90 TABLET | Refills: 1 | Status: SHIPPED | OUTPATIENT
Start: 2024-02-29

## 2024-02-29 NOTE — TELEPHONE ENCOUNTER
Please let pt know that she can reschedule her annual physical + f/u visit for after her baby is born. She just had TFT's done by her OB/GYN last month, so I am refilling same dose of synthroid for her                    February 29, 2024  Chana Hernadez   to Suyapa Mann   KL      2/29/24 11:34 AM  Philip Dale,     I will forward your message back to  and see what she recommends.     Thanks  Chana    This . [place]hart message has not been read.  Chana Hernadez   to Me   KL    2/29/24 11:34 AM  Please advise pt message below.       JT    2/29/24 11:19 AM  Viky Figueroa routed this conversation to [facility]  Suyapa Mann   to  [facility] (supporting You)   EW      2/29/24 10:17 AM  Good morning,  I am currently still scheduled for my annual physical, however, I am currently 33 weeks pregnant and have been going/scheduled for all of my prenatal appointments. I assume that I should just cancel this 03/20 appointment and reschedule for after I have my baby, or? But this also brings up the question about my Levothyroxine Thyroid medication refill that I typically get after my annual physical. If you could please let me know how we should go about this so I make sure that I can still have my medication refilled eventually as I only have about a month worth left or so, that would be greatly appreciated. Thank you!  Suyapa Mann

## 2024-03-05 PROBLEM — Z3A.34 34 WEEKS GESTATION OF PREGNANCY: Status: ACTIVE | Noted: 2023-10-04

## 2024-03-05 NOTE — PROGRESS NOTES
Problem List Items Addressed This Visit          Endocrine    Hypothyroidism affecting pregnancy     TSH 1/17/24-   1.75  Cont the current dose of medication             Other    34 weeks gestation of pregnancy - Primary     Normal Glucola    Last growth was 2/14    The AC is 89%   NC - 90%  We went over the risk of shoulder to the patient                     She has a very extensive birth plan  There is nothing in it that seems to be unreasonable   She is aware that as things progress could change     Wants no students   Wants no epidural and does not want us to ask her about any pain meds  She does not want any augmentation or time limit on labor    She will finalize her plan and bring it in.

## 2024-03-05 NOTE — ASSESSMENT & PLAN NOTE
Normal Glucola    Last growth was 2/14    The AC is 89%   NC - 90%  We went over the risk of shoulder to the patient

## 2024-03-07 ENCOUNTER — ROUTINE PRENATAL (OUTPATIENT)
Dept: OBGYN CLINIC | Facility: CLINIC | Age: 34
End: 2024-03-07

## 2024-03-07 VITALS — BODY MASS INDEX: 30.77 KG/M2 | SYSTOLIC BLOOD PRESSURE: 120 MMHG | DIASTOLIC BLOOD PRESSURE: 76 MMHG | WEIGHT: 220.6 LBS

## 2024-03-07 DIAGNOSIS — O99.283 HYPOTHYROIDISM AFFECTING PREGNANCY IN THIRD TRIMESTER: ICD-10-CM

## 2024-03-07 DIAGNOSIS — E03.9 HYPOTHYROIDISM AFFECTING PREGNANCY IN THIRD TRIMESTER: ICD-10-CM

## 2024-03-07 DIAGNOSIS — Z3A.34 34 WEEKS GESTATION OF PREGNANCY: Primary | ICD-10-CM

## 2024-03-07 PROCEDURE — PNV: Performed by: OBSTETRICS & GYNECOLOGY

## 2024-03-19 ENCOUNTER — ROUTINE PRENATAL (OUTPATIENT)
Dept: OBGYN CLINIC | Facility: CLINIC | Age: 34
End: 2024-03-19

## 2024-03-19 VITALS — BODY MASS INDEX: 30.71 KG/M2 | DIASTOLIC BLOOD PRESSURE: 80 MMHG | WEIGHT: 220.2 LBS | SYSTOLIC BLOOD PRESSURE: 122 MMHG

## 2024-03-19 DIAGNOSIS — O99.283 HYPOTHYROIDISM AFFECTING PREGNANCY IN THIRD TRIMESTER: Primary | ICD-10-CM

## 2024-03-19 DIAGNOSIS — E03.9 HYPOTHYROIDISM AFFECTING PREGNANCY IN THIRD TRIMESTER: Primary | ICD-10-CM

## 2024-03-19 DIAGNOSIS — Z34.93 THIRD TRIMESTER PREGNANCY: ICD-10-CM

## 2024-03-19 DIAGNOSIS — R03.0 ELEVATED BP WITHOUT DIAGNOSIS OF HYPERTENSION: ICD-10-CM

## 2024-03-19 DIAGNOSIS — Z3A.36 36 WEEKS GESTATION OF PREGNANCY: ICD-10-CM

## 2024-03-19 PROCEDURE — PNV: Performed by: ADVANCED PRACTICE MIDWIFE

## 2024-03-19 PROCEDURE — 87150 DNA/RNA AMPLIFIED PROBE: CPT | Performed by: ADVANCED PRACTICE MIDWIFE

## 2024-03-19 NOTE — PROGRESS NOTES
Routine Prenatal Visit  [facility] [address], [place]    Assessment/Plan:  Suyapa is a 33 y.o. year old  at 36w4d who presents for routine prenatal visit.     1. Hypothyroidism affecting pregnancy in third trimester    2. Third trimester pregnancy  -     Protein / creatinine ratio, urine; Future  -     CBC and differential; Future  -     Comprehensive metabolic panel; Future  -     Strep B DNA probe, amplification    3. 36 weeks gestation of pregnancy  Assessment & Plan:  - Reviewed s/s labor, FKC, perineal massage  - stopped ASA  - Birth plan- has personal birth plan  - GBS today  - last growth scan- 89% tile on 24  -next visit 1 week     Orders:  -     Protein / creatinine ratio, urine; Future  -     CBC and differential; Future  -     Comprehensive metabolic panel; Future  -     Strep B DNA probe, amplification    4. Elevated BP without diagnosis of hypertension  Assessment & Plan:  Had elevated pressure at lactation consultation.     Orders:  -     Protein / creatinine ratio, urine; Future  -     CBC and differential; Future  -     Comprehensive metabolic panel; Future          Subjective:     CC: Prenatal care    Suyapa Mann is a 33 y.o.  female who presents for routine prenatal care at 36w4d.  Pregnancy ROS: no leakage of fluid, pelvic pain, or vaginal bleeding.  Good fetal movement.   Suyapa states that last Tuesday she was at a lactation class- 145/88 and 149/87- she was feeling anxious at the time and was uncomfortable. Pressure today was 122/80- will do baseline PIH labs.    Notes some mild contractions.     The following portions of the patient's history were reviewed and updated as appropriate: allergies, current medications, past family history, past medical history, obstetric history, gynecologic history, past social history, past surgical history and problem list.      Objective:  /80   Wt 99.9 kg (220 lb 3.2 oz)   LMP 2023    BMI 30.71 kg/m²   Pregravid Weight/BMI: 88.5 kg (195 lb) (BMI 27.21)  Current Weight: 99.9 kg (220 lb 3.2 oz)   Total Weight Gain: 11.4 kg (25 lb 3.2 oz)   Pre- Vitals    Flowsheet Row Most Recent Value   Prenatal Assessment    Fetal Heart Rate 146   Movement Present   Prenatal Vitals    Blood Pressure 122/80   Weight - Scale 99.9 kg (220 lb 3.2 oz)   Urine Albumin/Glucose    Dilation/Effacement/Station    Vaginal Drainage    Edema    LLE Edema Trace   RLE Edema Trace           General: Well appearing, no distress  Respiratory: Unlabored breathing  Cardiovascular: Regular rate.  Abdomen: Soft, gravid, nontender  Fundal Height: Appropriate for gestational age.  Extremities: Warm and well perfused.  Non tender.

## 2024-03-19 NOTE — ASSESSMENT & PLAN NOTE
- Reviewed s/s labor, FKC, perineal massage  - stopped ASA  - Birth plan- has personal birth plan  - GBS today  - last growth scan- 89% tile on 2/14/24  -next visit 1 week

## 2024-03-21 LAB
ALBUMIN SERPL-MCNC: 3.4 G/DL (ref 3.6–5.1)
ALBUMIN/CREAT UR: 6 MG/G CREAT
ALBUMIN/GLOB SERPL: 1.2 (CALC) (ref 1–2.5)
ALP SERPL-CCNC: 133 U/L (ref 31–125)
ALT SERPL-CCNC: 20 U/L (ref 6–29)
AST SERPL-CCNC: 23 U/L (ref 10–30)
BASOPHILS # BLD AUTO: 44 CELLS/UL (ref 0–200)
BASOPHILS NFR BLD AUTO: 0.5 %
BILIRUB SERPL-MCNC: 0.5 MG/DL (ref 0.2–1.2)
BUN SERPL-MCNC: 8 MG/DL (ref 7–25)
BUN/CREAT SERPL: ABNORMAL (CALC) (ref 6–22)
CALCIUM SERPL-MCNC: 8.9 MG/DL (ref 8.6–10.2)
CHLORIDE SERPL-SCNC: 105 MMOL/L (ref 98–110)
CO2 SERPL-SCNC: 22 MMOL/L (ref 20–32)
CREAT SERPL-MCNC: 0.75 MG/DL (ref 0.5–0.97)
CREAT UR-MCNC: 128 MG/DL (ref 20–275)
EOSINOPHIL # BLD AUTO: 52 CELLS/UL (ref 15–500)
EOSINOPHIL NFR BLD AUTO: 0.6 %
ERYTHROCYTE [DISTWIDTH] IN BLOOD BY AUTOMATED COUNT: 12.5 % (ref 11–15)
GFR/BSA.PRED SERPLBLD CYS-BASED-ARV: 108 ML/MIN/1.73M2
GLOBULIN SER CALC-MCNC: 2.8 G/DL (CALC) (ref 1.9–3.7)
GLUCOSE SERPL-MCNC: 79 MG/DL (ref 65–99)
GP B STREP DNA SPEC QL NAA+PROBE: NEGATIVE
HCT VFR BLD AUTO: 36.1 % (ref 35–45)
HGB BLD-MCNC: 12.1 G/DL (ref 11.7–15.5)
LYMPHOCYTES # BLD AUTO: 1801 CELLS/UL (ref 850–3900)
LYMPHOCYTES NFR BLD AUTO: 20.7 %
MCH RBC QN AUTO: 29.9 PG (ref 27–33)
MCHC RBC AUTO-ENTMCNC: 33.5 G/DL (ref 32–36)
MCV RBC AUTO: 89.1 FL (ref 80–100)
MICROALBUMIN UR-MCNC: 0.8 MG/DL
MONOCYTES # BLD AUTO: 600 CELLS/UL (ref 200–950)
MONOCYTES NFR BLD AUTO: 6.9 %
NEUTROPHILS # BLD AUTO: 6203 CELLS/UL (ref 1500–7800)
NEUTROPHILS NFR BLD AUTO: 71.3 %
PLATELET # BLD AUTO: 313 THOUSAND/UL (ref 140–400)
PMV BLD REES-ECKER: 9.3 FL (ref 7.5–12.5)
POTASSIUM SERPL-SCNC: 4.4 MMOL/L (ref 3.5–5.3)
PROT SERPL-MCNC: 6.2 G/DL (ref 6.1–8.1)
RBC # BLD AUTO: 4.05 MILLION/UL (ref 3.8–5.1)
SODIUM SERPL-SCNC: 137 MMOL/L (ref 135–146)
WBC # BLD AUTO: 8.7 THOUSAND/UL (ref 3.8–10.8)

## 2024-03-26 ENCOUNTER — ROUTINE PRENATAL (OUTPATIENT)
Dept: OBGYN CLINIC | Facility: CLINIC | Age: 34
End: 2024-03-26

## 2024-03-26 VITALS — DIASTOLIC BLOOD PRESSURE: 78 MMHG | SYSTOLIC BLOOD PRESSURE: 122 MMHG | WEIGHT: 223 LBS | BODY MASS INDEX: 31.1 KG/M2

## 2024-03-26 DIAGNOSIS — Z3A.37 37 WEEKS GESTATION OF PREGNANCY: ICD-10-CM

## 2024-03-26 DIAGNOSIS — E03.9 HYPOTHYROIDISM AFFECTING PREGNANCY IN THIRD TRIMESTER: Primary | ICD-10-CM

## 2024-03-26 DIAGNOSIS — Z86.19 HISTORY OF HERPES LABIALIS: ICD-10-CM

## 2024-03-26 DIAGNOSIS — O99.283 HYPOTHYROIDISM AFFECTING PREGNANCY IN THIRD TRIMESTER: Primary | ICD-10-CM

## 2024-03-26 PROCEDURE — PNV: Performed by: STUDENT IN AN ORGANIZED HEALTH CARE EDUCATION/TRAINING PROGRAM

## 2024-03-26 RX ORDER — VALACYCLOVIR HYDROCHLORIDE 500 MG/1
500 TABLET, FILM COATED ORAL 2 TIMES DAILY
Qty: 60 TABLET | Refills: 11 | Status: SHIPPED | OUTPATIENT
Start: 2024-03-26 | End: 2025-03-26

## 2024-03-26 NOTE — ASSESSMENT & PLAN NOTE
- GBS negative  - Delivery Plan: Desires to await spontaneous labor  - Feeding: Breastfeeding  - Pediatrician: [facility]  - Discontinued aspirin at 36 weeks  - Perineal massage education reviewed  - Fetal kick counts and labor precautions reviewed.

## 2024-03-26 NOTE — PROGRESS NOTES
Routine Prenatal Visit  [facility] [address], [place]    Assessment/Plan:  Suyapa is a 33 y.o. year old  at 37w4d who presents for routine prenatal visit.     1. Hypothyroidism affecting pregnancy in third trimester    2. History of herpes labialis  -     valACYclovir (VALTREX) 500 mg tablet; Take 1 tablet (500 mg total) by mouth 2 (two) times a day    3. 37 weeks gestation of pregnancy  Assessment & Plan:  - GBS negative  - Delivery Plan: Desires to await spontaneous labor  - Feeding: Breastfeeding  - Pediatrician: [facility]  - Discontinued aspirin at 36 weeks  - Perineal massage education reviewed  - Fetal kick counts and labor precautions reviewed.                Subjective:     CC: Prenatal care    Suyapa Mann is a 33 y.o.  female who presents for routine prenatal care at 37w4d.  Pregnancy ROS: Denies leakage of fluid, pelvic pain, or vaginal bleeding.  Reports normal fetal movement.    The following portions of the patient's history were reviewed and updated as appropriate: allergies, current medications, past family history, past medical history, obstetric history, gynecologic history, past social history, past surgical history and problem list.      Objective:  /78   Wt 101 kg (223 lb)   LMP 2023   BMI 31.10 kg/m²   Pregravid Weight/BMI: 88.5 kg (195 lb) (BMI 27.21)  Current Weight: 101 kg (223 lb)   Total Weight Gain: 12.7 kg (28 lb)   Pre-Michael Vitals      Flowsheet Row Most Recent Value   Prenatal Assessment    Fetal Heart Rate 141   Movement Present   Prenatal Vitals    Blood Pressure 122/78   Weight - Scale 101 kg (223 lb)   Urine Albumin/Glucose    Dilation/Effacement/Station    Vaginal Drainage    Draining Fluid No   Edema    LLE Edema Trace   RLE Edema Trace   Facial Edema None             General: Well appearing, no distress  Respiratory: Unlabored breathing  Cardiovascular: Regular rate.  Abdomen: Soft, gravid, nontender  Fundal  Height: Appropriate for gestational age.  Extremities: Warm and well perfused.  Non tender.

## 2024-04-04 ENCOUNTER — ROUTINE PRENATAL (OUTPATIENT)
Dept: OBGYN CLINIC | Facility: CLINIC | Age: 34
End: 2024-04-04

## 2024-04-04 VITALS — BODY MASS INDEX: 31.52 KG/M2 | SYSTOLIC BLOOD PRESSURE: 126 MMHG | WEIGHT: 226 LBS | DIASTOLIC BLOOD PRESSURE: 88 MMHG

## 2024-04-04 DIAGNOSIS — O99.283 HYPOTHYROIDISM AFFECTING PREGNANCY IN THIRD TRIMESTER: ICD-10-CM

## 2024-04-04 DIAGNOSIS — Z3A.38 38 WEEKS GESTATION OF PREGNANCY: ICD-10-CM

## 2024-04-04 DIAGNOSIS — E03.9 HYPOTHYROIDISM AFFECTING PREGNANCY IN THIRD TRIMESTER: ICD-10-CM

## 2024-04-04 DIAGNOSIS — Z34.93 THIRD TRIMESTER PREGNANCY: Primary | ICD-10-CM

## 2024-04-04 DIAGNOSIS — Z86.19 HISTORY OF HERPES LABIALIS: ICD-10-CM

## 2024-04-04 PROCEDURE — PNV: Performed by: OBSTETRICS & GYNECOLOGY

## 2024-04-04 PROCEDURE — 87491 CHLMYD TRACH DNA AMP PROBE: CPT | Performed by: OBSTETRICS & GYNECOLOGY

## 2024-04-04 PROCEDURE — 87591 N.GONORRHOEAE DNA AMP PROB: CPT | Performed by: OBSTETRICS & GYNECOLOGY

## 2024-04-04 NOTE — PROGRESS NOTES
Assessment  33 y.o.  at 38w6d presenting for routine prenatal visit.     Plan  Diagnoses and all orders for this visit:    Third trimester pregnancy  38 weeks gestation of pregnancy  -     Labor precautions  - FKC  - Chlamydia/GC amplified DNA by PCR  - Return in 1wk for PN    Hypothyroidism affecting pregnancy in third trimester  - Continue levothyroxine    History of herpes labialis  - On suppression    ____________________________________________________________        Subjective    Suyapa Mann is a 33 y.o.  at 38w6d who presents for routine prenatal visit. She is without complaint. She denies regular contractions, loss of fluid, or vaginal bleeding. She feels regular fetal movements.     Pregnancy Problems:  Patient Active Problem List   Diagnosis    Hypothyroidism affecting pregnancy    BMI 26.0-26.9,adult    38 weeks gestation of pregnancy    Elevated BP without diagnosis of hypertension         Objective  /88   Wt 103 kg (226 lb)   LMP 2023   BMI 31.52 kg/m²     FHT: 131 BPM   Uterine Size: size equals dates     Physical Exam:  Physical Exam  Constitutional:       General: She is not in acute distress.     Appearance: Normal appearance. She is well-developed. She is not ill-appearing, toxic-appearing or diaphoretic.   HENT:      Head: Normocephalic and atraumatic.   Eyes:      General: No scleral icterus.        Right eye: No discharge.         Left eye: No discharge.      Conjunctiva/sclera: Conjunctivae normal.   Pulmonary:      Effort: Pulmonary effort is normal. No accessory muscle usage or respiratory distress.   Abdominal:      General: There is distension (gravid).      Tenderness: There is no abdominal tenderness. There is no guarding or rebound.   Skin:     General: Skin is warm and dry.      Coloration: Skin is not jaundiced.      Findings: No bruising, erythema or rash.   Neurological:      Mental Status: She is alert.   Psychiatric:         Mood and Affect: Mood  normal.         Behavior: Behavior normal.         Thought Content: Thought content normal.         Judgment: Judgment normal.

## 2024-04-05 LAB
C TRACH DNA SPEC QL NAA+PROBE: NEGATIVE
N GONORRHOEA DNA SPEC QL NAA+PROBE: NEGATIVE

## 2024-04-09 ENCOUNTER — ROUTINE PRENATAL (OUTPATIENT)
Dept: OBGYN CLINIC | Facility: CLINIC | Age: 34
End: 2024-04-09

## 2024-04-09 VITALS — SYSTOLIC BLOOD PRESSURE: 120 MMHG | WEIGHT: 225 LBS | DIASTOLIC BLOOD PRESSURE: 80 MMHG | BODY MASS INDEX: 31.38 KG/M2

## 2024-04-09 DIAGNOSIS — O99.283 HYPOTHYROIDISM AFFECTING PREGNANCY IN THIRD TRIMESTER: Primary | ICD-10-CM

## 2024-04-09 DIAGNOSIS — E03.9 HYPOTHYROIDISM AFFECTING PREGNANCY IN THIRD TRIMESTER: Primary | ICD-10-CM

## 2024-04-09 DIAGNOSIS — Z3A.39 39 WEEKS GESTATION OF PREGNANCY: ICD-10-CM

## 2024-04-09 PROCEDURE — PNV: Performed by: STUDENT IN AN ORGANIZED HEALTH CARE EDUCATION/TRAINING PROGRAM

## 2024-04-09 NOTE — PROGRESS NOTES
Routine Prenatal Visit  [facility] [address], [place], [place]    Assessment/Plan:  Suyapa is a 33 y.o. year old  at 39w4d who presents for routine prenatal visit.     1. Hypothyroidism affecting pregnancy in third trimester    2. 39 weeks gestation of pregnancy  Assessment & Plan:  - GBS negative  - Delivery Plan: Desires to await spontaneous labor, discussed Arrive Trial data in detail, discussed risks and management of post-term pregnancy, and scheduled 41 week NST/SELMA  - Feeding: Breastfeeding  - Pediatrician: [facility]  - Perineal massage education reviewed  - Fetal kick counts and labor precautions reviewed.                Subjective:     CC: Prenatal care    Suyapa Mann is a 33 y.o.  female who presents for routine prenatal care at 39w4d.  Pregnancy ROS: Denies leakage of fluid, pelvic pain, or vaginal bleeding.  Reports fetal movement.    The following portions of the patient's history were reviewed and updated as appropriate: allergies, current medications, past family history, past medical history, obstetric history, gynecologic history, past social history, past surgical history and problem list.      Objective:  /80   Wt 102 kg (225 lb)   LMP 2023   BMI 31.38 kg/m²   Pregravid Weight/BMI: 88.5 kg (195 lb) (BMI 27.21)  Current Weight: 102 kg (225 lb)   Total Weight Gain: 13.6 kg (30 lb)   Pre-Michael Vitals      Flowsheet Row Most Recent Value   Prenatal Assessment    Fetal Heart Rate 141   Movement Present   Prenatal Vitals    Blood Pressure 120/80   Weight - Scale 102 kg (225 lb)   Urine Albumin/Glucose    Dilation/Effacement/Station    Vaginal Drainage    Draining Fluid No   Edema    LLE Edema Trace   RLE Edema None   Facial Edema None             General: Well appearing, no distress  Respiratory: Unlabored breathing  Cardiovascular: Regular rate.  Abdomen: Soft, gravid, nontender  Fundal Height: Appropriate for gestational age.  Extremities:  Warm and well perfused.  Non tender.

## 2024-04-09 NOTE — ASSESSMENT & PLAN NOTE
- GBS negative  - Delivery Plan: Desires to await spontaneous labor, discussed Arrive Trial data in detail, discussed risks and management of post-term pregnancy, and scheduled 41 week NST/SELMA  - Feeding: Breastfeeding  - Pediatrician: [facility]  - Perineal massage education reviewed  - Fetal kick counts and labor precautions reviewed.

## 2024-04-16 ENCOUNTER — OFFICE VISIT (OUTPATIENT)
Dept: OBGYN CLINIC | Facility: CLINIC | Age: 34
End: 2024-04-16
Payer: COMMERCIAL

## 2024-04-16 VITALS
WEIGHT: 224 LBS | HEIGHT: 71 IN | SYSTOLIC BLOOD PRESSURE: 122 MMHG | DIASTOLIC BLOOD PRESSURE: 92 MMHG | BODY MASS INDEX: 31.36 KG/M2

## 2024-04-16 DIAGNOSIS — R03.0 ELEVATED BP WITHOUT DIAGNOSIS OF HYPERTENSION: ICD-10-CM

## 2024-04-16 DIAGNOSIS — E03.9 HYPOTHYROIDISM AFFECTING PREGNANCY, ANTEPARTUM: Primary | ICD-10-CM

## 2024-04-16 DIAGNOSIS — O99.280 HYPOTHYROIDISM AFFECTING PREGNANCY, ANTEPARTUM: Primary | ICD-10-CM

## 2024-04-16 DIAGNOSIS — Z3A.40 40 WEEKS GESTATION OF PREGNANCY: ICD-10-CM

## 2024-04-16 PROCEDURE — 59426 ANTEPARTUM CARE ONLY: CPT | Performed by: STUDENT IN AN ORGANIZED HEALTH CARE EDUCATION/TRAINING PROGRAM

## 2024-04-16 NOTE — ASSESSMENT & PLAN NOTE
- Diastolic BP is 92 today, systolic 122.  - She denies headache, visual disturbances, dyspnea, chest pain, epigastric pain, right upper quadrant pain, or generalized edema.  - Patient desires to avoid intervention at this time and has been ross irregularly since 5AM. Suspect she is early labor but she declines SVE.  Fetus Vertex on US with active movement and practice breathing noted.  We discussed precautions to present if developing headache, swelling, DFM. She will return in 72 hrs for NST BP check at 41w0d if undelivered.

## 2024-04-16 NOTE — ASSESSMENT & PLAN NOTE
- GBS negative  - Delivery Plan: patient desires low intervention birth, desires to await spontaneous labor, she declines SVE or membrane sweep, she will return on Friday at 41w0d for NST.  She reports good fetal movement.  - Feeding: Breastfeeding  - Pediatrician: [facility]  - Perineal massage education reviewed  - Fetal kick counts and labor precautions reviewed.

## 2024-04-16 NOTE — PROGRESS NOTES
Routine Prenatal Visit  [facility] [address] Daniel Knutson PA    Assessment/Plan:  Suyapa is a 33 y.o. year old  at 40w4d who presents for routine prenatal visit.     1. Hypothyroidism affecting pregnancy, antepartum    2. Elevated BP without diagnosis of hypertension  Assessment & Plan:  - Diastolic BP is 92 today, systolic 122.  - She denies headache, visual disturbances, dyspnea, chest pain, epigastric pain, right upper quadrant pain, or generalized edema.  - Patient desires to avoid intervention at this time and has been ross irregularly since 5AM. Suspect she is early labor but she declines SVE.  Fetus Vertex on US with active movement and practice breathing noted.  We discussed precautions to present if developing headache, swelling, DFM. She will return in 72 hrs for NST BP check at 41w0d if undelivered.       3. 40 weeks gestation of pregnancy  Assessment & Plan:  - GBS negative  - Delivery Plan: patient desires low intervention birth, desires to await spontaneous labor, she declines SVE or membrane sweep, she will return on Friday at 41w0d for NST.  She reports good fetal movement.  - Feeding: Breastfeeding  - Pediatrician: [facility]  - Perineal massage education reviewed  - Fetal kick counts and labor precautions reviewed.                Subjective:     CC: Prenatal care    Suyapa Mann is a 33 y.o.  female who presents for routine prenatal care at 40w4d.    Since 5AM she has noted intermittent contractions every 5-9 minutes that are 5/10 in strength. She reported some blood tinged mucus in the toilet and with wiping.  She reports active fetal movement.  She would prefer to defer presentation to the hospital until her contractions are more consistently 5 min apart and more painful.    The following portions of the patient's history were reviewed and updated as appropriate: allergies, current medications, past family history, past medical history, obstetric  "history, gynecologic history, past social history, past surgical history and problem list.      Objective:  /92   Ht 5' 11\" (1.803 m)   Wt 102 kg (224 lb)   LMP 2023   BMI 31.24 kg/m²   Pregravid Weight/BMI: 88.5 kg (195 lb) (BMI 27.21)  Current Weight: 102 kg (224 lb)   Total Weight Gain: 13.2 kg (29 lb)   Pre-Michael Vitals      Flowsheet Row Most Recent Value   Prenatal Assessment    Prenatal Vitals    Blood Pressure 122/92   Weight - Scale 102 kg (224 lb)   Urine Albumin/Glucose    Dilation/Effacement/Station    Vaginal Drainage    Edema              General: Well appearing, no distress; visible comfortable, able to talk through contraction observed  Respiratory: Unlabored breathing  Cardiovascular: Regular rate.  Abdomen: Soft, gravid, nontender  Fundal Height: Appropriate for gestational age. 40 cm.  Extremities: Warm and well perfused.  Non tender.  No edema.    Ultrasound: Cephalic, Fetal movements noted, Fetal practice breathing noted  SELMA 2 cm DVP    Fetal heart rate 138 bpm    Cristina Pollard MD  [facility]  [facility]  2024 1:10 PM    "

## 2024-04-17 ENCOUNTER — HOSPITAL ENCOUNTER (INPATIENT)
Facility: HOSPITAL | Age: 34
LOS: 2 days | Discharge: HOME | End: 2024-04-19
Attending: OBSTETRICS & GYNECOLOGY | Admitting: OBSTETRICS & GYNECOLOGY
Payer: COMMERCIAL

## 2024-04-17 DIAGNOSIS — Z3A.40 40 WEEKS GESTATION OF PREGNANCY: Primary | ICD-10-CM

## 2024-04-17 PROBLEM — O13.9 GESTATIONAL HYPERTENSION: Status: ACTIVE | Noted: 2024-03-19

## 2024-04-17 PROBLEM — O14.90 PREECLAMPSIA: Status: ACTIVE | Noted: 2024-03-19

## 2024-04-17 LAB
ABO GROUP BLD: NORMAL
ABO GROUP BLD: NORMAL
ALBUMIN SERPL BCP-MCNC: 3.7 G/DL (ref 3.5–5)
ALP SERPL-CCNC: 177 U/L (ref 34–104)
ALT SERPL W P-5'-P-CCNC: 24 U/L (ref 7–52)
ANION GAP SERPL CALCULATED.3IONS-SCNC: 10 MMOL/L (ref 4–13)
AST SERPL W P-5'-P-CCNC: 28 U/L (ref 13–39)
BASE EXCESS BLDCOA CALC-SCNC: -5.3 MMOL/L (ref 3–11)
BASE EXCESS BLDCOV CALC-SCNC: -5.5 MMOL/L (ref 1–9)
BASOPHILS # BLD AUTO: 0.03 THOUSANDS/ÂΜL (ref 0–0.1)
BASOPHILS NFR BLD AUTO: 0 % (ref 0–1)
BILIRUB SERPL-MCNC: 0.58 MG/DL (ref 0.2–1)
BLD GP AB SCN SERPL QL: NEGATIVE
BUN SERPL-MCNC: 13 MG/DL (ref 5–25)
CALCIUM SERPL-MCNC: 9.3 MG/DL (ref 8.4–10.2)
CHLORIDE SERPL-SCNC: 104 MMOL/L (ref 96–108)
CO2 SERPL-SCNC: 20 MMOL/L (ref 21–32)
CREAT SERPL-MCNC: 0.87 MG/DL (ref 0.6–1.3)
CREAT UR-MCNC: 141.4 MG/DL
EOSINOPHIL # BLD AUTO: 0.04 THOUSAND/ÂΜL (ref 0–0.61)
EOSINOPHIL NFR BLD AUTO: 0 % (ref 0–6)
ERYTHROCYTE [DISTWIDTH] IN BLOOD BY AUTOMATED COUNT: 13.3 % (ref 11.6–15.1)
GFR SERPL CREATININE-BSD FRML MDRD: 87 ML/MIN/1.73SQ M
GLUCOSE SERPL-MCNC: 96 MG/DL (ref 65–140)
HCO3 BLDCOA-SCNC: 23.2 MMOL/L (ref 17.3–27.3)
HCO3 BLDCOV-SCNC: 19.7 MMOL/L (ref 12.2–28.6)
HCT VFR BLD AUTO: 38 % (ref 34.8–46.1)
HGB BLD-MCNC: 13.5 G/DL (ref 11.5–15.4)
HOLD SPECIMEN: NORMAL
IMM GRANULOCYTES # BLD AUTO: 0.14 THOUSAND/UL (ref 0–0.2)
IMM GRANULOCYTES NFR BLD AUTO: 1 % (ref 0–2)
LYMPHOCYTES # BLD AUTO: 1.98 THOUSANDS/ÂΜL (ref 0.6–4.47)
LYMPHOCYTES NFR BLD AUTO: 15 % (ref 14–44)
MCH RBC QN AUTO: 30.3 PG (ref 26.8–34.3)
MCHC RBC AUTO-ENTMCNC: 35.5 G/DL (ref 31.4–37.4)
MCV RBC AUTO: 85 FL (ref 82–98)
MONOCYTES # BLD AUTO: 0.58 THOUSAND/ÂΜL (ref 0.17–1.22)
MONOCYTES NFR BLD AUTO: 5 % (ref 4–12)
NEUTROPHILS # BLD AUTO: 10.09 THOUSANDS/ÂΜL (ref 1.85–7.62)
NEUTS SEG NFR BLD AUTO: 79 % (ref 43–75)
NRBC BLD AUTO-RTO: 0 /100 WBCS
O2 CT VFR BLDCOA CALC: 9.3 ML/DL
OXYHGB MFR BLDCOA: 37.6 %
OXYHGB MFR BLDCOV: 66.5 %
PCO2 BLDCOA: 56.2 MM[HG] (ref 30–60)
PCO2 BLDCOV: 38 MM HG (ref 27–43)
PH BLDCOA: 7.23 [PH] (ref 7.23–7.43)
PH BLDCOV: 7.33 [PH] (ref 7.19–7.49)
PLATELET # BLD AUTO: 326 THOUSANDS/UL (ref 149–390)
PMV BLD AUTO: 9.3 FL (ref 8.9–12.7)
PO2 BLDCOA: 20.5 MM HG (ref 5–25)
PO2 BLDCOV: 29.1 MM HG (ref 15–45)
POTASSIUM SERPL-SCNC: 3.8 MMOL/L (ref 3.5–5.3)
PROT SERPL-MCNC: 7.3 G/DL (ref 6.4–8.4)
PROT UR-MCNC: 52 MG/DL
PROT/CREAT UR: 0.37 MG/G{CREAT} (ref 0–0.1)
RBC # BLD AUTO: 4.45 MILLION/UL (ref 3.81–5.12)
RH BLD: POSITIVE
RH BLD: POSITIVE
SAO2 % BLDCOV: 16.4 ML/DL
SODIUM SERPL-SCNC: 134 MMOL/L (ref 135–147)
SPECIMEN EXPIRATION DATE: NORMAL
TREPONEMA PALLIDUM IGG+IGM AB [PRESENCE] IN SERUM OR PLASMA BY IMMUNOASSAY: NORMAL
WBC # BLD AUTO: 12.86 THOUSAND/UL (ref 4.31–10.16)

## 2024-04-17 PROCEDURE — 86850 RBC ANTIBODY SCREEN: CPT

## 2024-04-17 PROCEDURE — 84156 ASSAY OF PROTEIN URINE: CPT

## 2024-04-17 PROCEDURE — 86780 TREPONEMA PALLIDUM: CPT

## 2024-04-17 PROCEDURE — 4A1HXCZ MONITORING OF PRODUCTS OF CONCEPTION, CARDIAC RATE, EXTERNAL APPROACH: ICD-10-PCS | Performed by: OBSTETRICS & GYNECOLOGY

## 2024-04-17 PROCEDURE — 86900 BLOOD TYPING SEROLOGIC ABO: CPT

## 2024-04-17 PROCEDURE — 0KQM0ZZ REPAIR PERINEUM MUSCLE, OPEN APPROACH: ICD-10-PCS | Performed by: OBSTETRICS & GYNECOLOGY

## 2024-04-17 PROCEDURE — 82805 BLOOD GASES W/O2 SATURATION: CPT | Performed by: OBSTETRICS & GYNECOLOGY

## 2024-04-17 PROCEDURE — 85025 COMPLETE CBC W/AUTO DIFF WBC: CPT

## 2024-04-17 PROCEDURE — 99202 OFFICE O/P NEW SF 15 MIN: CPT

## 2024-04-17 PROCEDURE — 80053 COMPREHEN METABOLIC PANEL: CPT

## 2024-04-17 PROCEDURE — 59409 OBSTETRICAL CARE: CPT | Performed by: OBSTETRICS & GYNECOLOGY

## 2024-04-17 PROCEDURE — 82570 ASSAY OF URINE CREATININE: CPT

## 2024-04-17 PROCEDURE — 86901 BLOOD TYPING SEROLOGIC RH(D): CPT

## 2024-04-17 PROCEDURE — NC001 PR NO CHARGE: Performed by: OBSTETRICS & GYNECOLOGY

## 2024-04-17 RX ORDER — OXYTOCIN/RINGER'S LACTATE 30/500 ML
PLASTIC BAG, INJECTION (ML) INTRAVENOUS
Status: DISPENSED
Start: 2024-04-17 | End: 2024-04-17

## 2024-04-17 RX ORDER — OXYTOCIN/RINGER'S LACTATE 30/500 ML
250 PLASTIC BAG, INJECTION (ML) INTRAVENOUS ONCE
Status: DISCONTINUED | OUTPATIENT
Start: 2024-04-17 | End: 2024-04-19 | Stop reason: HOSPADM

## 2024-04-17 RX ORDER — DIPHENHYDRAMINE HYDROCHLORIDE 50 MG/ML
25 INJECTION INTRAMUSCULAR; INTRAVENOUS EVERY 6 HOURS PRN
Status: DISCONTINUED | OUTPATIENT
Start: 2024-04-17 | End: 2024-04-19 | Stop reason: HOSPADM

## 2024-04-17 RX ORDER — IBUPROFEN 600 MG/1
600 TABLET ORAL EVERY 6 HOURS
Status: DISCONTINUED | OUTPATIENT
Start: 2024-04-17 | End: 2024-04-19 | Stop reason: HOSPADM

## 2024-04-17 RX ORDER — BUPIVACAINE HYDROCHLORIDE 2.5 MG/ML
30 INJECTION, SOLUTION EPIDURAL; INFILTRATION; INTRACAUDAL ONCE AS NEEDED
Status: DISCONTINUED | OUTPATIENT
Start: 2024-04-17 | End: 2024-04-17

## 2024-04-17 RX ORDER — CALCIUM CARBONATE 500 MG/1
1000 TABLET, CHEWABLE ORAL 2 TIMES DAILY PRN
Status: DISCONTINUED | OUTPATIENT
Start: 2024-04-17 | End: 2024-04-17

## 2024-04-17 RX ORDER — SODIUM CHLORIDE, SODIUM LACTATE, POTASSIUM CHLORIDE, CALCIUM CHLORIDE 600; 310; 30; 20 MG/100ML; MG/100ML; MG/100ML; MG/100ML
125 INJECTION, SOLUTION INTRAVENOUS CONTINUOUS
Status: DISCONTINUED | OUTPATIENT
Start: 2024-04-17 | End: 2024-04-17

## 2024-04-17 RX ORDER — ACETAMINOPHEN 325 MG/1
650 TABLET ORAL EVERY 6 HOURS PRN
Status: DISCONTINUED | OUTPATIENT
Start: 2024-04-17 | End: 2024-04-17

## 2024-04-17 RX ORDER — BUPIVACAINE HYDROCHLORIDE 2.5 MG/ML
30 INJECTION, SOLUTION EPIDURAL; INFILTRATION; INTRACAUDAL ONCE
Status: COMPLETED | OUTPATIENT
Start: 2024-04-17 | End: 2024-04-17

## 2024-04-17 RX ORDER — ACETAMINOPHEN 325 MG/1
650 TABLET ORAL EVERY 6 HOURS PRN
Status: DISCONTINUED | OUTPATIENT
Start: 2024-04-17 | End: 2024-04-19 | Stop reason: HOSPADM

## 2024-04-17 RX ORDER — BENZOCAINE/MENTHOL 6 MG-10 MG
1 LOZENGE MUCOUS MEMBRANE DAILY PRN
Status: DISCONTINUED | OUTPATIENT
Start: 2024-04-17 | End: 2024-04-19 | Stop reason: HOSPADM

## 2024-04-17 RX ORDER — ONDANSETRON 2 MG/ML
4 INJECTION INTRAMUSCULAR; INTRAVENOUS EVERY 6 HOURS PRN
Status: DISCONTINUED | OUTPATIENT
Start: 2024-04-17 | End: 2024-04-17

## 2024-04-17 RX ORDER — POLYETHYLENE GLYCOL 3350 17 G/17G
17 POWDER, FOR SOLUTION ORAL DAILY PRN
Status: DISCONTINUED | OUTPATIENT
Start: 2024-04-17 | End: 2024-04-19 | Stop reason: HOSPADM

## 2024-04-17 RX ORDER — DOCUSATE SODIUM 100 MG/1
100 CAPSULE, LIQUID FILLED ORAL 2 TIMES DAILY
Status: DISCONTINUED | OUTPATIENT
Start: 2024-04-17 | End: 2024-04-19 | Stop reason: HOSPADM

## 2024-04-17 RX ORDER — ONDANSETRON 2 MG/ML
4 INJECTION INTRAMUSCULAR; INTRAVENOUS EVERY 8 HOURS PRN
Status: DISCONTINUED | OUTPATIENT
Start: 2024-04-17 | End: 2024-04-19 | Stop reason: HOSPADM

## 2024-04-17 RX ORDER — VALACYCLOVIR HYDROCHLORIDE 500 MG/1
500 TABLET, FILM COATED ORAL 2 TIMES DAILY
Status: DISCONTINUED | OUTPATIENT
Start: 2024-04-17 | End: 2024-04-17

## 2024-04-17 RX ORDER — CALCIUM CARBONATE 500 MG/1
1000 TABLET, CHEWABLE ORAL DAILY PRN
Status: DISCONTINUED | OUTPATIENT
Start: 2024-04-17 | End: 2024-04-19 | Stop reason: HOSPADM

## 2024-04-17 RX ADMIN — WITCH HAZEL 1 PAD: 500 SOLUTION RECTAL; TOPICAL at 08:53

## 2024-04-17 RX ADMIN — BUPIVACAINE HYDROCHLORIDE 30 ML: 2.5 INJECTION, SOLUTION EPIDURAL; INFILTRATION; INTRACAUDAL at 07:05

## 2024-04-17 RX ADMIN — DOCUSATE SODIUM 100 MG: 100 CAPSULE, LIQUID FILLED ORAL at 08:53

## 2024-04-17 RX ADMIN — CALCIUM CARBONATE (ANTACID) CHEW TAB 500 MG 1000 MG: 500 CHEW TAB at 15:33

## 2024-04-17 RX ADMIN — IBUPROFEN 600 MG: 600 TABLET ORAL at 08:53

## 2024-04-17 RX ADMIN — BUPIVACAINE HYDROCHLORIDE 30 ML: 2.5 INJECTION, SOLUTION EPIDURAL; INFILTRATION; INTRACAUDAL; PERINEURAL at 07:00

## 2024-04-17 RX ADMIN — IBUPROFEN 600 MG: 600 TABLET ORAL at 22:05

## 2024-04-17 RX ADMIN — BENZOCAINE AND LEVOMENTHOL 1 APPLICATION: 200; 5 SPRAY TOPICAL at 14:10

## 2024-04-17 RX ADMIN — DOCUSATE SODIUM 100 MG: 100 CAPSULE, LIQUID FILLED ORAL at 17:44

## 2024-04-17 RX ADMIN — IBUPROFEN 600 MG: 600 TABLET ORAL at 15:33

## 2024-04-17 NOTE — OB LABOR/OXYTOCIN SAFETY PROGRESS
Labor Progress Note - Suyapa Mann 33 y.o. female MRN: [id number]    Unit/Bed#: -01 Encounter: [id number]       Contraction Frequency (minutes): 1-3  Contraction Intensity: Strong  Uterine Activity Characteristics: Regular  Cervical Dilation: Lip/rim (Comment)        Cervical Effacement: 90  Fetal Station: 1  Baseline Rate (FHR): 135 bpm  Fetal Heart Rate (FHT): 130 BPM  FHR Category: 1               Vital Signs:   Vitals:    04/17/24 0500   BP: 135/92   Pulse: 75   Resp:    Temp: 97.7 °F (36.5 °C)   SpO2:        Notes/comments:   SVE as above. Dr. Johnson Siegel DO 4/17/2024 5:18 AM

## 2024-04-17 NOTE — H&P
H & P- Obstetrics   Suyapa Mann 33 y.o. female MRN: [id number]  Unit/Bed#: -01 Encounter: [id number]    Assessment: 33 y.o.  at 40w5d admitted for labor .  SVE: /-1  FHT: Cat 1  Clinical EFW: 89% ; Cephalic confirmed by CRISTY  GBS status: negative       Plan:   History of herpes labialis  Assessment & Plan  On valtrex, no recent outbreaks  No lesions noted on exam    Gestational hypertension  Assessment & Plan  Elevated BP at PN visit on , Elevated BP x2 on admit, met criteria on admission   CBC, CMP, PC ratio collected on admit  Asymptomatic    Systolic (24hrs), Av , Min:122 , Max:155     Diastolic (24hrs), Av, Min:75, Max:92        40 weeks gestation of pregnancy  Assessment & Plan  Admit to OBGYN for labor  Clear liquid diet   F/u T&S, CBC, RPR   IVF LR 125cc/hr   Continuous fetal monitoring and tocometry   Analgesia at maternal request   Vertex by CRISTY  Plan for expectant management  Reviewed pt birth preferences. Prefers Hep lock, agreeable to IV fluids if indicated. Prefers not to be offered pain medication or epidural. She prefers to ask for meds if she feels she needs them, prefers limited SVEs, prefers no students or obstervers       Hypothyroidism affecting pregnancy  Assessment & Plan  On Levothyroxine 75mcg, ordered          Discussed case and plan w/ Dr. Ornelas      Chief Complaint: contractions    HPI: Suyapa Mann is a 33 y.o.  with an LINDA of 2024, by Last Menstrual Period at 40w5d who is being admitted for labor . She reports contractions intermittently since 0500 yesterday morning, which have increased in frequency to every 2-3 minutes for the past hour or so, has no LOF, and reports no VB. She states she has felt good FM.    Patient Active Problem List   Diagnosis    Hypothyroidism affecting pregnancy    BMI 26.0-26.9,adult    40 weeks gestation of pregnancy    Elevated BP without diagnosis of hypertension    History of herpes labialis       Baby  complications/comments: none    Review of Systems   Gastrointestinal:  Positive for abdominal pain.   All other systems reviewed and are negative.      OB Hx:  OB History    Para Term  AB Living   1 0 0 0 0 0   SAB IAB Ectopic Multiple Live Births   0 0 0 0 0      # Outcome Date GA Lbr Erick/2nd Weight Sex Delivery Anes PTL Lv   1 Current                Past Medical Hx:  Past Medical History:   Diagnosis Date    Abnormal Pap smear of cervix     Disease of thyroid gland     Herpes        Past Surgical hx:  Past Surgical History:   Procedure Laterality Date    COLPOSCOPY         Social Hx:  Alcohol use: denies  Tobacco use: denies  Other substance use: denies    No Known Allergies    Medications Prior to Admission   Medication    aspirin 81 mg chewable tablet    levothyroxine 75 mcg tablet    Prenatal MV-Min-Fe Fum-FA-DHA (PRENATAL 1 PO)    valACYclovir (VALTREX) 500 mg tablet       Objective:  Temp:  [97.9 °F (36.6 °C)-98 °F (36.7 °C)] 97.9 °F (36.6 °C)  HR:  [82-90] 90  Resp:  [18-20] 18  BP: (122-155)/(75-92) 125/90  There is no height or weight on file to calculate BMI.     Physical Exam:  Physical Exam  Constitutional:       Appearance: Normal appearance. She is not ill-appearing or toxic-appearing.   Genitourinary:      Vulva normal.      Genitourinary Comments: No lesions noted, no recent outbreaks per pt      No vaginal discharge.   HENT:      Head: Normocephalic and atraumatic.      Right Ear: External ear normal.      Left Ear: External ear normal.      Nose: Nose normal.      Mouth/Throat:      Mouth: Mucous membranes are moist.   Eyes:      General: Lids are normal.      Extraocular Movements: Extraocular movements intact.      Conjunctiva/sclera: Conjunctivae normal.   Cardiovascular:      Rate and Rhythm: Normal rate and regular rhythm.      Pulses: Normal pulses.      Heart sounds: Normal heart sounds. No murmur heard.  Pulmonary:      Effort: Pulmonary effort is normal. No respiratory  distress.   Abdominal:      Palpations: Abdomen is soft.      Tenderness: There is no abdominal tenderness.      Comments: gravid   Musculoskeletal:      Cervical back: Full passive range of motion without pain and normal range of motion.      Right lower leg: No edema.      Left lower leg: No edema.      Right foot: Normal range of motion.      Left foot: Normal range of motion.   Neurological:      General: No focal deficit present.      Mental Status: She is alert.      Motor: No weakness.   Skin:     General: Skin is warm and dry.   Psychiatric:         Mood and Affect: Mood normal.         Judgment: Judgment normal.   Vitals reviewed. Exam conducted with a chaperone present.            FHT: 140 bpm, moderate variability on initial evaluation      TOCO: q1-3 min    Lab Results   Component Value Date    WBC 8.7 03/20/2024    HGB 12.1 03/20/2024    HCT 36.1 03/20/2024     03/20/2024     Lab Results   Component Value Date    K 4.4 03/20/2024     03/20/2024    CO2 22 03/20/2024    BUN 8 03/20/2024    CREATININE 0.75 03/20/2024    AST 23 03/20/2024    ALT 20 03/20/2024     Prenatal Labs: Reviewed      Blood type: A positive per [facility] record 8/13/23  Antibody: negative per [facility] record 8/13/23  GBS: negative  HIV: non-reactive  Rubella: immune  Syphilis IgM/IgG: non-reactive  HBsAg: non-reactive  HCAb: non-reactive  Chlamydia: negative  Gonorrhea: negative  Diabetes 1 hour screen: Normal  Platelets: 313,000 as of 3/20/24  Hgb: 12.1 as of 3/20/24  >2 Midnights  INPATIENT     Signature/Title: Ernie Siegel DO  Date: 4/17/2024  Time: 2:23 AM

## 2024-04-17 NOTE — PLAN OF CARE

## 2024-04-17 NOTE — OB LABOR/OXYTOCIN SAFETY PROGRESS
Labor Progress Note - Suyapa Mann 33 y.o. female MRN: [id number]    Unit/Bed#: -01 Encounter: [id number]       Contraction Frequency (minutes): 1-2  Contraction Intensity: Strong  Uterine Activity Characteristics: Regular  Cervical Dilation: 8-9        Cervical Effacement: 90  Fetal Station: 0  Baseline Rate (FHR): 130 bpm  Fetal Heart Rate (FHT): 130 BPM  FHR Category: 1               Vital Signs:   Vitals:    04/17/24 0355   BP: 115/64   Pulse: 78   Resp:    Temp:    SpO2:        Notes/comments:   SVE as above. SROM clear fluid. FHT Cat 1. Dr. Ornelas aware.     Ernie Siegel DO 4/17/2024 4:39 AM

## 2024-04-17 NOTE — OB LABOR/OXYTOCIN SAFETY PROGRESS
Labor Progress Note - Suyapa Mann 33 y.o. female MRN: [id number]    Unit/Bed#: -01 Encounter: [id number]       Contraction Frequency (minutes): 2-4  Contraction Intensity: Moderate/Strong  Uterine Activity Characteristics: Irregular  Cervical Dilation: 6-7        Cervical Effacement: 90  Fetal Station: -1  Baseline Rate (FHR): 130 bpm  Fetal Heart Rate (FHT): 135 BPM  FHR Category: 1               Vital Signs:   Vitals:    04/17/24 0214   BP: 125/90   Pulse: 90   Resp: 18   Temp: 97.9 °F (36.6 °C)   SpO2:        Notes/comments:   Patient just got out of birthing tub. Back in labor bed with novi monitoring system. More pressure with contractions than prior. SVE as above. Change from prior exam. FHT Cat 1. Dr. Ornelas aware.     Ernie Siegel DO 4/17/2024 3:58 AM

## 2024-04-17 NOTE — ASSESSMENT & PLAN NOTE
Admit to OBGYN for labor  Clear liquid diet   F/u T&S, CBC, RPR   IVF LR 125cc/hr   Continuous fetal monitoring and tocometry   Analgesia at maternal request   Vertex by TAUS  Plan for expectant management  Reviewed pt birth preferences. Prefers Hep lock, agreeable to IV fluids if indicated. Prefers not to be offered pain medication or epidural. She prefers to ask for meds if she feels she needs them, prefers limited SVEs, prefers no students or obstervers

## 2024-04-17 NOTE — LACTATION NOTE
This note was copied from a baby's chart.  CONSULT - LACTATION  Baby Boy (Cynthia Mnan 0 days male MRN: [id number]    [facility] NURSERY Room / Bed: (N)/(N) Encounter: [id number]    Maternal Information     MOTHER:  Suyapa Mann  Maternal Age: 33 y.o.   OB History: # 1 - Date: 24, Sex: Male, Weight: 4100 g (9 lb 0.6 oz), GA: 40w5d, Delivery: Vaginal, Spontaneous, Apgar1: 8, Apgar5: 9, Living: Living, Birth Comments: None   Previouse breast reduction surgery? No    Lactation history:   Has patient previously breast fed: No   How long had patient previously breast fed:     Previous breast feeding complications:       Past Surgical History:   Procedure Laterality Date    COLPOSCOPY          Birth information:  YOB: 2024   Time of birth: 6:54 AM   Sex: male   Delivery type: Vaginal, Spontaneous   Birth Weight: 4100 g (9 lb 0.6 oz)   Percent of Weight Change: 0%     Gestational Age: 40w5d   [unfilled]    Assessment     Breast and nipple assessment: normal assessment     Assessment: normal assessment    Feeding assessment:  sleepy , a few sucks with stimulation.  LATCH:  Latch: Repeated attempts, hold nipple in mouth, stimulate to suck   Audible Swallowing: A few with stimulation   Type of Nipple: Everted (After stimulation)   Comfort (Breast/Nipple): Soft/non-tender   Hold (Positioning): Partial assist, teach one side, mother does other, staff holds   LATCH Score: 7          Feeding recommendations:  breast feed on demand    Met with parents and provided them with the Ready Set Baby Booklet and reviewed information.     Discussed Skin to Skin contact and benefits to mom and baby.  Feeding cues and what that means for recognizing infant's hunger reviewed. Avoidance of pacifiers for the first month discussed. Talked about exclusive breastfeeding for the first 6 months.    Positioning and latch reviewed as well as showing images of other  feeding positions.  Discussed the properties of a good latch in any position. Reviewed hand/manual expression.    A latch assessment was done using the football hold, baby took a few sucks and fell asleep. Suyapa will call when baby is showing feeding cues. Suyapa is able to hand express her colostrum.    Gave information on common concerns, what to expect the first few weeks after delivery, preparing for other caregivers, and how partners can help. Resources for support also provided.               Palmira Collins RN 4/17/2024 2:44 PM

## 2024-04-17 NOTE — ASSESSMENT & PLAN NOTE
Elevated BP at PN visit on , Elevated BP x2 on admit, met criteria on admission   CBC, CM wnl, PC ratio collected on admit 0.37  Asymptomatic  Systolic (12hrs), Av , Min:129 , Max:129   Diastolic (12hrs), Av, Min:73, Max:73

## 2024-04-17 NOTE — LACTATION NOTE
"CONSULT - LACTATION  Suyapa Mann 33 y.o. female MRN: [id number]    [facility] [place] L&D Room / Bed:  417/ 417- Encounter: [id number]    Maternal Information     MOTHER:  N/A  Maternal Age: This patient's mother is not on file.  OB History: This patient's mother is not on file.  Previouse breast reduction surgery? {YES/NO:29479}    Lactation history:   Has patient previously breast fed:     How long had patient previously breast fed:     Previous breast feeding complications:     This patient's mother is not on file.    Birth information:  YOB: 1990   Time of birth:     Sex: female   Delivery type:     Birth Weight: No birth weight on file.   Percent of Weight Change: Birth weight not on file     Gestational Age: <None>   [unfilled]    Assessment     Breast and nipple assessment: { Lactation breast assessment:91457::\"normal assessment\"}     Assessment: { LACTATION NBRN ASSESS:38884::\"normal assessment\"}    Feeding assessment: { LACTATION FEEDING ASSESS:18769::\"feeding well\"}  LATCH:  Latch:     Audible Swallowing:     Type of Nipple:     Comfort (Breast/Nipple):     Hold (Positioning):     LATCH Score:            Feeding recommendations:  { LACTATION FEEDING RECOMMENDATIONS:82948::\"breast feed on demand\"}    Palmira Collins RN 2024 2:29 PM  "

## 2024-04-17 NOTE — DISCHARGE SUMMARY
Discharge Summary - Suyapa Mann 33 y.o. female MRN: [id number]    Unit/Bed#: -01 Encounter: [id number]    ADMISSION  Admission Date: 2024   Admitting Attending: Dr. Melissa Ornelas MD  Admitting Diagnoses:   Patient Active Problem List   Diagnosis    Hypothyroidism affecting pregnancy    BMI 26.0-26.9,adult    40 weeks gestation of pregnancy    Preeclampsia without severe features    History of herpes labialis     (spontaneous vaginal delivery)       DELIVERY  Delivery Method: Vaginal, Spontaneous    Delivery Date and Time: 2024  6:54 AM   Delivery Attending: Melissa Ornelas ***    DISCHARGE  Discharge Date: ***  Discharge Attending: Dr. Melissa Ornelas MD***  Discharge Diagnosis:   Same, Delivered  ***  Clinical course: Admission to Delivery  Suyapa Mann is a 33 y.o.  who was admitted at 40w5d for labor. Her initial SVE was 5/90/-1. Her membarnes were in tact. She had spontaneous rupture of membranes with clear fluid. She progressed to complete cervical dilation and began pushing.       Delivery  Route of Delivery: Vaginal, Spontaneous     Anesthesia: Local ,   QBL:      ***    Delivery: Vaginal, Spontaneous  at 2024  6:54 AM   Laceration: Perineal: 2° , Right sulcus, Repaired? Yes     Baby's Weight:  ;    ***  Apgar scores: 8  and 9  at 1 and 5 minutes, respectively  Pt declined pitocin after delivery. No evidence of uterine atony at time of delivery.     Clinical Course: Post-Delivery:  The post delivery course was preeclampsia without severe features diagnosed on admission. Her CBC, CMP were within normal limits. Her protein creatinine ratio was 0.37. ***    On the day of discharge, the patient was ambulating, voiding spontaneously, tolerating oral intake, and hemodynamically stable. She was able to reasonably perform all ADLs. She had appropriate bowel function. Pain was well-controlled. She was discharged home on postpartum/postop day #*** without  complications***. Patient was instructed to follow up with her OB as an outpatient and was given appropriate warnings to call her provider with problems or concerns.    Pertinent lab findings included:   Blood type A positive.     Last three Hgb values:  Lab Results   Component Value Date    HGB 13.5 2024    HGB 12.1 2024    HGB 11.7 2024        Problem-specific follow-up plans included the following:  Problem List       Hypothyroidism affecting pregnancy    Current Assessment & Plan     On Levothyroxine 75mcg, ordered         BMI 26.0-26.9,adult    40 weeks gestation of pregnancy    Current Assessment & Plan     Admit to OBGYN for labor  Clear liquid diet   F/u T&S, CBC, RPR   IVF LR 125cc/hr   Continuous fetal monitoring and tocometry   Analgesia at maternal request   Vertex by TAUS  Plan for expectant management  Reviewed pt birth preferences. Prefers Hep lock, agreeable to IV fluids if indicated. Prefers not to be offered pain medication or epidural. She prefers to ask for meds if she feels she needs them, prefers limited SVEs, prefers no students or obstervers            Preeclampsia without severe features    Current Assessment & Plan     Elevated BP at PN visit on , Elevated BP x2 on admit, met criteria on admission   CBC, CM wnl, PC ratio collected on admit 0.37  Asymptomatic    Systolic (24hrs), Av , Min:115 , Max:155     Diastolic (24hrs), Av, Min:64, Max:92             History of herpes labialis    Current Assessment & Plan     On valtrex, no recent outbreaks  No lesions noted on exam         * (Principal)  (spontaneous vaginal delivery)        Discharge med list:  Contraception: ***     Medication List      ASK your doctor about these medications     aspirin 81 mg chewable tablet; Chew 2 tablets (162 mg total) daily Stop   at 36 weeks gestation   levothyroxine 75 mcg tablet; Take 1 tablet (75 mcg total) by mouth every   morning   PRENATAL 1 PO   valACYclovir 500 mg  "tablet; Commonly known as: VALTREX; Take 1 tablet   (500 mg total) by mouth 2 (two) times a day       Condition at discharge:   {condition:22615}     Disposition:   {Discharge Disposition:22158}    Planned Readmission:   {EXAM; YES/NO:19492::\"No\"}          "

## 2024-04-17 NOTE — L&D DELIVERY NOTE
DELIVERY NOTE  Suyapa Mann 33 y.o. female MRN: [id number]  Unit/Bed#: -01 Encounter: [id number]    Obstetrician:    Dr. Melissa Ornelas MD    Assistant:   Dr. Ernie Siegel DO    Pre-Delivery Diagnosis:   IUP @ 40w5d  Preeclampsia without severe features  Hypothyroidism  Hx herpes labialis, no current lesions  Rh positive  GBS negative    Post-Delivery Diagnosis:   Same as above - Delivered  Viable male fetus  2nd degree laceration  Sulcus tear    Procedure:  Spontaneous vaginal delivery  Repair 2nd degree and sulcus spontaneous laceration      Anesthesia:  Local     Specimens:   Cord blood obtained   Placenta; normal appearing, marginal insertion, intact   Arterial and venous blood gases (below)     Gases:  Umbilical Cord Venous Blood Gas:  Results from last 7 days   Lab Units 24  0655   PH COV  7.333   PCO2 COV mm HG 38.0   HCO3 COV mmol/L 19.7   BASE EXC COV mmol/L -5.5*   O2 CT CD VB mL/dL 16.4   O2 HGB, VENOUS CORD % 66.5     Umbilical Cord Arterial Blood Gas:  Results from last 7 days   Lab Units 24  0655   PH COA  7.234   PCO2 COA  56.2   PO2 COA mm HG 20.5   HCO3 COA mmol/L 23.2   BASE EXC COA mmol/L -5.3*   O2 CONTENT CORD ART ml/dl 9.3   O2 HGB, ARTERIAL CORD % 37.6     Quantitative Blood Loss:   568 mL           Complications:    None apparent    Brief Description of Labor Course:  Pt declined pitocin after delivery. No evidence of uterine atony at time of delivery.      Description of Delivery:   With  the assistance of maternal expulsive forces, the fetal vertex delivered spontaneously. A nuchal cord was  noted and . The anterior right shoulder was delivered atraumatically with gentle downward traction. The contralateral arm was delivered with gentle upward traction. The remainder of the fetus delivered spontaneously at 0654, resulting in a viable male .     Upon delivery, the infant was placed on the mothers abdomen and the cord was doubly clamped and cut after 30  seconds. The  was noted to have good tone and cry spontaneously. There was no evidence of injury.  The  was examined by nursing at bedside. Umbilical cord blood and umbilical artery and venous gases were collected and sent to the lab. An intact placenta was delivered spontaneously at 0700 using fundal massage and gentle cord traction and was noted to have a marginally-inserted 3-vessel cord. Per patient request, pitocin was not given after delivery. No evidence of uterine atony. Uterine fundus was firm at umbilicus. Majority of blood loss noted from sulcal laceration.      Outcome:  Living  with APGARS 8 (1 min) and 9 (5 min).    weight: pending    Inspection of the perineum, vagina, labia, cervix, and urethra revealed a right sulcal and second degree laceration. Bleeding was noted to be under control.     Perineal Inspection/Laceration Repair  The vagina, cervix, perineum, and rectum were inspected and there was noted to be a 2nd degree laceration which was repaired with 3-0 vicryl rapide. A total of 40cc of 0.25% Marcaine was used for local anesthesia for repairs.  The vaginal laceration was identified and an anchoring suture was placed 1 cm above the apex.  The vaginal mucosa and underlying rectovaginal fascia were closed in a running locked fashion to the hymenal ring.  The suture was then brought underneath the hymenal ring. Continuing with the same suture, the transverse perineal muscles were reapproximated with 2 transverse running sutures.  The suture was brought to the posterior apex of the skin laceration and then the skin was reapproximated in a subcuticular fashion to the hymenal ring.  An additional running suture of 3-0 3-0 vicryl rapide was used to re approximate the sulcal laceration beginning at the apex and was anchored inferiorly.     At the conclusion of the delivery, all needle, sponge, and instrument counts were noted to be correct. Patient tolerated the  procedure well and was allowed to recover in labor and delivery room with family and  before being transferred to the post-partum floor.     Conclusion:  Mother and baby are currently recovering nicely in stable condition.    Attending Supervision:   Dr. Melissa Ornelas MD was present for the entire procedure.    Ernie Siegel DO  OB/GYN PGY-2  2024 7:44 AM

## 2024-04-17 NOTE — OB LABOR/OXYTOCIN SAFETY PROGRESS
Labor Progress Note - Suyapa Mann 33 y.o. female MRN: [id number]    Unit/Bed#: -01 Encounter: [id number]       Contraction Frequency (minutes): 2-3  Contraction Intensity: Strong  Uterine Activity Characteristics: Regular  Cervical Dilation: 10  Dilation Complete Date: 04/17/24  Dilation Complete Time: 0609  Cervical Effacement: 100  Fetal Station: 2  Baseline Rate (FHR): 130 bpm  Fetal Heart Rate (FHT): 130 BPM  FHR Category: II               Vital Signs:   Vitals:    04/17/24 0549   BP: 131/85   Pulse: 90   Resp:    Temp:    SpO2:        Notes/comments:     Exam per Dr. Siegel - anterior lip was able to be reduced, pushing discussed and commencing. FHT with intermittent drop out during contractions but moderate variability without decelerations    Melissa Ornelas MD 4/17/2024 6:09 AM

## 2024-04-17 NOTE — OB LABOR/OXYTOCIN SAFETY PROGRESS
Labor Progress Note - Suyapa Mann 33 y.o. female MRN: [id number]    Unit/Bed#: -01 Encounter: [id number]       Contraction Frequency (minutes): 1-3  Contraction Intensity: Strong  Uterine Activity Characteristics: Regular  Cervical Dilation: Lip/rim (Comment)        Cervical Effacement: 90  Fetal Station: 2  Baseline Rate (FHR): 130 bpm  Fetal Heart Rate (FHT): 130 BPM  FHR Category: 1               Vital Signs:   Vitals:    04/17/24 0500   BP: 135/92   Pulse: 75   Resp:    Temp: 97.7 °F (36.5 °C)   SpO2:        Notes/comments:   SVE as above. Unable to reduced anterior lip. IV access lost in patient right hand after moving in bed. New IV access established in left arm. Dr. Ornelas present at bedside.    Ernie Siegel DO 4/17/2024 5:48 AM

## 2024-04-18 PROCEDURE — 99024 POSTOP FOLLOW-UP VISIT: CPT | Performed by: OBSTETRICS & GYNECOLOGY

## 2024-04-18 RX ADMIN — IBUPROFEN 600 MG: 600 TABLET ORAL at 13:02

## 2024-04-18 RX ADMIN — DOCUSATE SODIUM 100 MG: 100 CAPSULE, LIQUID FILLED ORAL at 08:07

## 2024-04-18 RX ADMIN — LEVOTHYROXINE SODIUM 75 MCG: 25 TABLET ORAL at 05:00

## 2024-04-18 RX ADMIN — ACETAMINOPHEN 325MG 650 MG: 325 TABLET ORAL at 16:29

## 2024-04-18 RX ADMIN — DOCUSATE SODIUM 100 MG: 100 CAPSULE, LIQUID FILLED ORAL at 17:29

## 2024-04-18 RX ADMIN — IBUPROFEN 600 MG: 600 TABLET ORAL at 22:11

## 2024-04-18 RX ADMIN — IBUPROFEN 600 MG: 600 TABLET ORAL at 04:59

## 2024-04-18 NOTE — PLAN OF CARE
Problem: PAIN - ADULT  Goal: Verbalizes/displays adequate comfort level or baseline comfort level  Description: Interventions:  - Encourage patient to monitor pain and request assistance  - Assess pain using appropriate pain scale  - Administer analgesics based on type and severity of pain and evaluate response  - Implement non-pharmacological measures as appropriate and evaluate response  - Consider cultural and social influences on pain and pain management  - Notify physician/advanced practitioner if interventions unsuccessful or patient reports new pain  Outcome: Progressing     Problem: INFECTION - ADULT  Goal: Absence or prevention of progression during hospitalization  Description: INTERVENTIONS:  - Assess and monitor for signs and symptoms of infection  - Monitor lab/diagnostic results  - Monitor all insertion sites, i.e. indwelling lines, tubes, and drains  - Monitor endotracheal if appropriate and nasal secretions for changes in amount and color  - [place] appropriate cooling/warming therapies per order  - Administer medications as ordered  - Instruct and encourage patient and family to use good hand hygiene technique  - Identify and instruct in appropriate isolation precautions for identified infection/condition  Outcome: Progressing  Goal: Absence of fever/infection during neutropenic period  Description: INTERVENTIONS:  - Monitor WBC    Outcome: Progressing     Problem: SAFETY ADULT  Goal: Patient will remain free of falls  Description: INTERVENTIONS:  - Educate patient/family on patient safety including physical limitations  - Instruct patient to call for assistance with activity   - Consult OT/PT to assist with strengthening/mobility   - Keep Call bell within reach  - Keep bed low and locked with side rails adjusted as appropriate  - Keep care items and personal belongings within reach  - Initiate and maintain comfort rounds  - Make Fall Risk Sign visible to staff  - Offer Toileting every  Hours,  in advance of need  - Initiate/Maintain alarm  - Obtain necessary fall risk management equipment:   - Apply yellow socks and bracelet for high fall risk patients  - Consider moving patient to room near nurses station  Outcome: Progressing  Goal: Maintain or return to baseline ADL function  Description: INTERVENTIONS:  -  Assess patient's ability to carry out ADLs; assess patient's baseline for ADL function and identify physical deficits which impact ability to perform ADLs (bathing, care of mouth/teeth, toileting, grooming, dressing, etc.)  - Assess/evaluate cause of self-care deficits   - Assess range of motion  - Assess patient's mobility; develop plan if impaired  - Assess patient's need for assistive devices and provide as appropriate  - Encourage maximum independence but intervene and supervise when necessary  - Involve family in performance of ADLs  - Assess for home care needs following discharge   - Consider OT consult to assist with ADL evaluation and planning for discharge  - Provide patient education as appropriate  Outcome: Progressing  Goal: Maintains/Returns to pre admission functional level  Description: INTERVENTIONS:  - Perform AM-PAC 6 Click Basic Mobility/ Daily Activity assessment daily.  - Set and communicate daily mobility goal to care team and patient/family/caregiver.   - Collaborate with rehabilitation services on mobility goals if consulted  - Perform Range of Motion  times a day.  - Reposition patient every  hours.  - Dangle patient  times a day  - Stand patient  times a day  - Ambulate patient  times a day  - Out of bed to chair  times a day   - Out of bed for meals  times a day  - Out of bed for toileting  - Record patient progress and toleration of activity level   Outcome: Progressing     Problem: Knowledge Deficit  Goal: Patient/family/caregiver demonstrates understanding of disease process, treatment plan, medications, and discharge instructions  Description: Complete learning  assessment and assess knowledge base.  Interventions:  - Provide teaching at level of understanding  - Provide teaching via preferred learning methods  Outcome: Progressing     Problem: DISCHARGE PLANNING  Goal: Discharge to home or other facility with appropriate resources  Description: INTERVENTIONS:  - Identify barriers to discharge w/patient and caregiver  - Arrange for needed discharge resources and transportation as appropriate  - Identify discharge learning needs (meds, wound care, etc.)  - Arrange for interpretive services to assist at discharge as needed  - Refer to Case Management Department for coordinating discharge planning if the patient needs post-hospital services based on physician/advanced practitioner order or complex needs related to functional status, cognitive ability, or social support system  Outcome: Progressing

## 2024-04-18 NOTE — PROGRESS NOTES
"Progress Note - OB/GYN  Suyapa Mann 33 y.o. female MRN: [id number]  Unit/Bed#: -01 Encounter: [id number]    Assessment and Plan     Suyapa Mann is a patient of: [facility]. She is PPD# 1 s/p . Recovering well and is stable.     *  (spontaneous vaginal delivery)  Assessment & Plan  - Pain well controlled with oral analgesics  - Voiding spontaneously  - Tolerating PO fluids and solids  - Ambulating without difficulty  - Encourage breastfeeding and familial bonding    History of herpes labialis  Assessment & Plan  On valtrex, no recent outbreaks  No lesions noted on exam    Preeclampsia without severe features  Assessment & Plan  Elevated BP at PN visit on , Elevated BP x2 on admit, met criteria on admission   CBC, CM wnl, PC ratio collected on admit 0.37  Asymptomatic  Systolic (12hrs), Av , Min:105 , Max:122   Diastolic (12hrs), Av, Min:65, Max:88      Hypothyroidism affecting pregnancy  Assessment & Plan  On Levothyroxine 75mcg, ordered        Disposition    -  Anticipate discharge home on PPD# 2 - tomorrow      Subjective/Objective     Chief Complaint: Postpartum State     Subjective:    Suyapa Mann is PPD#1 s/p . She has no current complaints.  Pain is well controlled with medications.  Patient is currently voiding and ambulating without difficulty.  Patient is currently passing flatus, tolerating PO diet, and denies nausea or vomitting. Patient denies fever, chills, chest pain, shortness of breath, or calf tenderness. Lochia is normal. She is Breastfeeding. She is recovering well and is stable.       Vitals:   /65 (BP Location: Left arm)   Pulse 78   Temp 98.1 °F (36.7 °C) (Oral)   Resp 16   Ht 5' 11\" (1.803 m)   Wt 102 kg (225 lb)   LMP 2023   SpO2 96%   Breastfeeding Yes   BMI 31.38 kg/m²       Intake/Output Summary (Last 24 hours) at 2024 0620  Last data filed at 2024 1645  Gross per 24 hour   Intake --   Output 2418 ml   Net " -4648 ml       Physical Exam:   GEN: Suyapa Mann appears well, alert and oriented x 3, pleasant and cooperative   CARDIO: RRR, no murmurs or rubs  RESP:  CTAB, no wheezes or rales  ABDOMEN: soft, no tenderness, no distention, fundus firm and below umbilicus  EXTREMITIES: SCDs on, non tender, no erythema    Labs:     Hemoglobin   Date Value Ref Range Status   04/17/2024 13.5 11.5 - 15.4 g/dL Final   03/20/2024 12.1 11.7 - 15.5 g/dL Final   01/17/2024 11.7 11.7 - 15.5 g/dL Final   10/05/2023 12.7 11.5 - 15.4 g/dL Final     WBC   Date Value Ref Range Status   04/17/2024 12.86 (H) 4.31 - 10.16 Thousand/uL Final   10/05/2023 6.73 4.31 - 10.16 Thousand/uL Final     White Blood Cell Count   Date Value Ref Range Status   03/20/2024 8.7 3.8 - 10.8 Thousand/uL Final   01/17/2024 7.9 3.8 - 10.8 Thousand/uL Final     Platelet Count   Date Value Ref Range Status   03/20/2024 313 140 - 400 Thousand/uL Final   01/17/2024 271 140 - 400 Thousand/uL Final     Platelets   Date Value Ref Range Status   04/17/2024 326 149 - 390 Thousands/uL Final   10/05/2023 288 149 - 390 Thousands/uL Final     Creatinine   Date Value Ref Range Status   04/17/2024 0.87 0.60 - 1.30 mg/dL Final     Comment:     Standardized to IDMS reference method   03/20/2024 0.75 0.50 - 0.97 mg/dL Final   03/23/2022 0.71 0.40 - 1.10 mg/dL Final     AST   Date Value Ref Range Status   04/17/2024 28 13 - 39 U/L Final   03/20/2024 23 10 - 30 U/L Final   03/23/2022 9 <41 U/L Final     ALT   Date Value Ref Range Status   04/17/2024 24 7 - 52 U/L Final     Comment:     Specimen collection should occur prior to Sulfasalazine administration due to the potential for falsely depressed results.    03/20/2024 20 6 - 29 U/L Final   03/23/2022 12 <56 U/L Final          Zohreh Akins MD  4/18/2024  6:20 AM

## 2024-04-18 NOTE — LACTATION NOTE
This note was copied from a baby's chart.    In to see family today. Mom asked for assistance with latch.   Education on positioning and alignment.   Mom is encouraged to:     - Bring baby up to the breast (use of pillows to elevate so baby's torso is against mom's breasts)   - Skin to skin for feedings with top hand exposed to show signs of satiation   - Chin deep into breast tissue (make baby look up to the nipple)   - nose aligned to the nipple   -Wait for wide gape, drag chin on the breast so nipple is aimed at the upper, back palate  - Cheek should be touching breast   - Deep, firm hold of baby with ear, shoulder, hip alignment    Mom had baby at left breast using football hold. Worked on positioning infant up at chest level and starting to feed infant with nose arriving at the nipple. Then, using areolar compression to achieve a deep latch that is comfortable and exchanges optimum amounts of milk. Guided Dyad to deep latch. Stimulated to suck with some rocker suckling. Burp attempt. Then to right breast also using football hold. Stimulated to suck till asleep at breast. Mom noted less discomfort and better suckling with some audible swallowing and breast softening and relaxed tone after feeding. Dad supportive at bedside. Also shown signs of good latch/feed.      04/18/24 1100   Maternal Information   Has mother  before? No   Infant to breast within first hour of birth? Yes   Exclusive Pump and Bottle Feed No   LATCH Documentation   Latch 1  (did better with time)   Audible Swallowing 2   Type of Nipple 2   Comfort (Breast/Nipple) 2   Hold (Positioning) 1   LATCH Score 8   Having latch problems? No  (working on consistent deep latch)   Position(s) Used Football   Breasts/Nipples   Left Breast Soft   Right Breast Soft   Left Nipple Everted   Right Nipple Everted   Intervention Other (comment);Hand expression  (helped with positioning/maintaining deep latch)   Breastfeeding Progress Not yet established    Breast Pump   Pump 3  (has breast pump from Insurance @ bedside)   Patient Follow-Up   Lactation Consult Status 1   Follow-Up Type Inpatient;Call as needed   Other OB Lactation Documentation    Additional Problem Noted helped with positioning/maintaining deep latch  (has BOTH Booklets @ bedside)       Encouraged parents to call for assistance, questions, and concerns about breastfeeding.  Extension provided.

## 2024-04-18 NOTE — PLAN OF CARE
Problem: PAIN - ADULT  Goal: Verbalizes/displays adequate comfort level or baseline comfort level  Description: Interventions:  - Encourage patient to monitor pain and request assistance  - Assess pain using appropriate pain scale  - Administer analgesics based on type and severity of pain and evaluate response  - Implement non-pharmacological measures as appropriate and evaluate response  - Consider cultural and social influences on pain and pain management  - Notify physician/advanced practitioner if interventions unsuccessful or patient reports new pain  Outcome: Progressing     Problem: INFECTION - ADULT  Goal: Absence or prevention of progression during hospitalization  Description: INTERVENTIONS:  - Assess and monitor for signs and symptoms of infection  - Monitor lab/diagnostic results  - Monitor all insertion sites, i.e. indwelling lines, tubes, and drains  - Monitor endotracheal if appropriate and nasal secretions for changes in amount and color  - [employer] appropriate cooling/warming therapies per order  - Administer medications as ordered  - Instruct and encourage patient and family to use good hand hygiene technique  - Identify and instruct in appropriate isolation precautions for identified infection/condition  Outcome: Progressing  Goal: Absence of fever/infection during neutropenic period  Description: INTERVENTIONS:  - Monitor WBC    Outcome: Progressing     Problem: SAFETY ADULT  Goal: Patient will remain free of falls  Description: INTERVENTIONS:  - Educate patient/family on patient safety including physical limitations  - Instruct patient to call for assistance with activity   - Consult OT/PT to assist with strengthening/mobility   - Keep Call bell within reach  - Keep bed low and locked with side rails adjusted as appropriate  - Keep care items and personal belongings within reach  - Initiate and maintain comfort rounds  - Make Fall Risk Sign visible to staff  - Offer Toileting every  Hours,  in advance of need  - Initiate/Maintain alarm  - Obtain necessary fall risk management equipment:   - Apply yellow socks and bracelet for high fall risk patients  - Consider moving patient to room near nurses station  Outcome: Progressing  Goal: Maintain or return to baseline ADL function  Description: INTERVENTIONS:  -  Assess patient's ability to carry out ADLs; assess patient's baseline for ADL function and identify physical deficits which impact ability to perform ADLs (bathing, care of mouth/teeth, toileting, grooming, dressing, etc.)  - Assess/evaluate cause of self-care deficits   - Assess range of motion  - Assess patient's mobility; develop plan if impaired  - Assess patient's need for assistive devices and provide as appropriate  - Encourage maximum independence but intervene and supervise when necessary  - Involve family in performance of ADLs  - Assess for home care needs following discharge   - Consider OT consult to assist with ADL evaluation and planning for discharge  - Provide patient education as appropriate  Outcome: Progressing  Goal: Maintains/Returns to pre admission functional level  Description: INTERVENTIONS:  - Perform AM-PAC 6 Click Basic Mobility/ Daily Activity assessment daily.  - Set and communicate daily mobility goal to care team and patient/family/caregiver.   - Collaborate with rehabilitaton services on mobility goals if consulted  - Perform Range of Motion  times a day.  - Reposition patient every  hours.  - Dangle patient  times a day  - Stand patient  times a day  - Ambulate patient  times a day  - Out of bed to chair  times a day   - Out of bed for meals  times a day  - Out of bed for toileting  - Record patient progress and toleration of activity level   Outcome: Progressing     Problem: Knowledge Deficit  Goal: Patient/family/caregiver demonstrates understanding of disease process, treatment plan, medications, and discharge instructions  Description: Complete learning  assessment and assess knowledge base.  Interventions:  - Provide teaching at level of understanding  - Provide teaching via preferred learning methods  Outcome: Progressing     Problem: DISCHARGE PLANNING  Goal: Discharge to home or other facility with appropriate resources  Description: INTERVENTIONS:  - Identify barriers to discharge w/patient and caregiver  - Arrange for needed discharge resources and transportation as appropriate  - Identify discharge learning needs (meds, wound care, etc.)  - Arrange for interpretive services to assist at discharge as needed  - Refer to Case Management Department for coordinating discharge planning if the patient needs post-hospital services based on physician/advanced practitioner order or complex needs related to functional status, cognitive ability, or social support system  Outcome: Progressing

## 2024-04-19 VITALS
HEIGHT: 71 IN | DIASTOLIC BLOOD PRESSURE: 89 MMHG | RESPIRATION RATE: 18 BRPM | HEART RATE: 83 BPM | WEIGHT: 225 LBS | BODY MASS INDEX: 31.5 KG/M2 | TEMPERATURE: 97.7 F | OXYGEN SATURATION: 99 % | SYSTOLIC BLOOD PRESSURE: 135 MMHG

## 2024-04-19 PROCEDURE — NC001 PR NO CHARGE: Performed by: OBSTETRICS & GYNECOLOGY

## 2024-04-19 PROCEDURE — 99024 POSTOP FOLLOW-UP VISIT: CPT | Performed by: OBSTETRICS & GYNECOLOGY

## 2024-04-19 RX ORDER — ACETAMINOPHEN 325 MG/1
650 TABLET ORAL EVERY 6 HOURS PRN
Start: 2024-04-19

## 2024-04-19 RX ORDER — IBUPROFEN 600 MG/1
600 TABLET ORAL EVERY 6 HOURS
Qty: 30 TABLET | Refills: 0 | Status: SHIPPED | OUTPATIENT
Start: 2024-04-19

## 2024-04-19 RX ORDER — POLYETHYLENE GLYCOL 3350 17 G/17G
17 POWDER, FOR SOLUTION ORAL DAILY PRN
Start: 2024-04-19

## 2024-04-19 RX ORDER — BENZOCAINE/MENTHOL 6 MG-10 MG
1 LOZENGE MUCOUS MEMBRANE DAILY PRN
Start: 2024-04-19

## 2024-04-19 RX ADMIN — LEVOTHYROXINE SODIUM 75 MCG: 25 TABLET ORAL at 06:11

## 2024-04-19 RX ADMIN — IBUPROFEN 600 MG: 600 TABLET ORAL at 04:03

## 2024-04-19 RX ADMIN — IBUPROFEN 600 MG: 600 TABLET ORAL at 09:42

## 2024-04-19 RX ADMIN — DOCUSATE SODIUM 100 MG: 100 CAPSULE, LIQUID FILLED ORAL at 09:43

## 2024-04-19 NOTE — PLAN OF CARE
Problem: PAIN - ADULT  Goal: Verbalizes/displays adequate comfort level or baseline comfort level  Description: Interventions:  - Encourage patient to monitor pain and request assistance  - Assess pain using appropriate pain scale  - Administer analgesics based on type and severity of pain and evaluate response  - Implement non-pharmacological measures as appropriate and evaluate response  - Consider cultural and social influences on pain and pain management  - Notify physician/advanced practitioner if interventions unsuccessful or patient reports new pain  4/19/2024 1017 by Karine David RN  Outcome: Adequate for Discharge  4/19/2024 0728 by Karine David RN  Outcome: Progressing     Problem: INFECTION - ADULT  Goal: Absence or prevention of progression during hospitalization  Description: INTERVENTIONS:  - Assess and monitor for signs and symptoms of infection  - Monitor lab/diagnostic results  - Monitor all insertion sites, i.e. indwelling lines, tubes, and drains  - Monitor endotracheal if appropriate and nasal secretions for changes in amount and color  - [place] appropriate cooling/warming therapies per order  - Administer medications as ordered  - Instruct and encourage patient and family to use good hand hygiene technique  - Identify and instruct in appropriate isolation precautions for identified infection/condition  4/19/2024 1017 by Karine David RN  Outcome: Adequate for Discharge  4/19/2024 0728 by Karine David RN  Outcome: Progressing  Goal: Absence of fever/infection during neutropenic period  Description: INTERVENTIONS:  - Monitor WBC    4/19/2024 1017 by Karine David RN  Outcome: Adequate for Discharge  4/19/2024 0728 by Karine David RN  Outcome: Progressing     Problem: SAFETY ADULT  Goal: Patient will remain free of falls  Description: INTERVENTIONS:  - Educate patient/family on patient safety including physical limitations  - Instruct patient to call for  assistance with activity   - Consult OT/PT to assist with strengthening/mobility   - Keep Call bell within reach  - Keep bed low and locked with side rails adjusted as appropriate  - Keep care items and personal belongings within reach  - Initiate and maintain comfort rounds  - Make Fall Risk Sign visible to staff  - Apply yellow socks and bracelet for high fall risk patients  - Consider moving patient to room near nurses station  4/19/2024 1017 by Karine David RN  Outcome: Adequate for Discharge  4/19/2024 0728 by Karine David RN  Outcome: Progressing  Goal: Maintain or return to baseline ADL function  Description: INTERVENTIONS:  -  Assess patient's ability to carry out ADLs; assess patient's baseline for ADL function and identify physical deficits which impact ability to perform ADLs (bathing, care of mouth/teeth, toileting, grooming, dressing, etc.)  - Assess/evaluate cause of self-care deficits   - Assess range of motion  - Assess patient's mobility; develop plan if impaired  - Assess patient's need for assistive devices and provide as appropriate  - Encourage maximum independence but intervene and supervise when necessary  - Involve family in performance of ADLs  - Assess for home care needs following discharge   - Consider OT consult to assist with ADL evaluation and planning for discharge  - Provide patient education as appropriate  4/19/2024 1017 by Karine David RN  Outcome: Adequate for Discharge  4/19/2024 0728 by Karine David RN  Outcome: Progressing  Goal: Maintains/Returns to pre admission functional level  Description: INTERVENTIONS:  - Perform AM-PAC 6 Click Basic Mobility/ Daily Activity assessment daily.  - Set and communicate daily mobility goal to care team and patient/family/caregiver.   - Collaborate with rehabilitation services on mobility goals if consulted  - Out of bed for toileting  - Record patient progress and toleration of activity level   4/19/2024 1017 by  Karine David RN  Outcome: Adequate for Discharge  4/19/2024 0728 by Karine David RN  Outcome: Progressing     Problem: Knowledge Deficit  Goal: Patient/family/caregiver demonstrates understanding of disease process, treatment plan, medications, and discharge instructions  Description: Complete learning assessment and assess knowledge base.  Interventions:  - Provide teaching at level of understanding  - Provide teaching via preferred learning methods  4/19/2024 1017 by Karine David RN  Outcome: Adequate for Discharge  4/19/2024 0728 by Karine David RN  Outcome: Progressing     Problem: DISCHARGE PLANNING  Goal: Discharge to home or other facility with appropriate resources  Description: INTERVENTIONS:  - Identify barriers to discharge w/patient and caregiver  - Arrange for needed discharge resources and transportation as appropriate  - Identify discharge learning needs (meds, wound care, etc.)  - Arrange for interpretive services to assist at discharge as needed  - Refer to Case Management Department for coordinating discharge planning if the patient needs post-hospital services based on physician/advanced practitioner order or complex needs related to functional status, cognitive ability, or social support system  4/19/2024 1017 by Karine David RN  Outcome: Adequate for Discharge  4/19/2024 0728 by Karine David RN  Outcome: Progressing

## 2024-04-19 NOTE — PLAN OF CARE
Problem: PAIN - ADULT  Goal: Verbalizes/displays adequate comfort level or baseline comfort level  Description: Interventions:  - Encourage patient to monitor pain and request assistance  - Assess pain using appropriate pain scale  - Administer analgesics based on type and severity of pain and evaluate response  - Implement non-pharmacological measures as appropriate and evaluate response  - Consider cultural and social influences on pain and pain management  - Notify physician/advanced practitioner if interventions unsuccessful or patient reports new pain  Outcome: Progressing     Problem: INFECTION - ADULT  Goal: Absence or prevention of progression during hospitalization  Description: INTERVENTIONS:  - Assess and monitor for signs and symptoms of infection  - Monitor lab/diagnostic results  - Monitor all insertion sites, i.e. indwelling lines, tubes, and drains  - Monitor endotracheal if appropriate and nasal secretions for changes in amount and color  - [place] appropriate cooling/warming therapies per order  - Administer medications as ordered  - Instruct and encourage patient and family to use good hand hygiene technique  - Identify and instruct in appropriate isolation precautions for identified infection/condition  Outcome: Progressing  Goal: Absence of fever/infection during neutropenic period  Description: INTERVENTIONS:  - Monitor WBC    Outcome: Progressing     Problem: SAFETY ADULT  Goal: Patient will remain free of falls  Description: INTERVENTIONS:  - Educate patient/family on patient safety including physical limitations  - Instruct patient to call for assistance with activity   - Consult OT/PT to assist with strengthening/mobility   - Keep Call bell within reach  - Keep bed low and locked with side rails adjusted as appropriate  - Keep care items and personal belongings within reach  - Initiate and maintain comfort rounds  - Make Fall Risk Sign visible to staff  - Offer Toileting every  Hours,  in advance of need  - Initiate/Maintain alarm  - Obtain necessary fall risk management equipment:   - Apply yellow socks and bracelet for high fall risk patients  - Consider moving patient to room near nurses station  Outcome: Progressing  Goal: Maintain or return to baseline ADL function  Description: INTERVENTIONS:  -  Assess patient's ability to carry out ADLs; assess patient's baseline for ADL function and identify physical deficits which impact ability to perform ADLs (bathing, care of mouth/teeth, toileting, grooming, dressing, etc.)  - Assess/evaluate cause of self-care deficits   - Assess range of motion  - Assess patient's mobility; develop plan if impaired  - Assess patient's need for assistive devices and provide as appropriate  - Encourage maximum independence but intervene and supervise when necessary  - Involve family in performance of ADLs  - Assess for home care needs following discharge   - Consider OT consult to assist with ADL evaluation and planning for discharge  - Provide patient education as appropriate  Outcome: Progressing  Goal: Maintains/Returns to pre admission functional level  Description: INTERVENTIONS:  - Perform AM-PAC 6 Click Basic Mobility/ Daily Activity assessment daily.  - Set and communicate daily mobility goal to care team and patient/family/caregiver.   - Collaborate with rehabilitation services on mobility goals if consulted  - Perform Range of Motion  times a day.  - Reposition patient every  hours.  - Dangle patient  times a day  - Stand patient  times a day  - Ambulate patient  times a day  - Out of bed to chair  times a day   - Out of bed for meals times a day  - Out of bed for toileting  - Record patient progress and toleration of activity level   Outcome: Progressing     Problem: Knowledge Deficit  Goal: Patient/family/caregiver demonstrates understanding of disease process, treatment plan, medications, and discharge instructions  Description: Complete learning  assessment and assess knowledge base.  Interventions:  - Provide teaching at level of understanding  - Provide teaching via preferred learning methods  Outcome: Progressing     Problem: DISCHARGE PLANNING  Goal: Discharge to home or other facility with appropriate resources  Description: INTERVENTIONS:  - Identify barriers to discharge w/patient and caregiver  - Arrange for needed discharge resources and transportation as appropriate  - Identify discharge learning needs (meds, wound care, etc.)  - Arrange for interpretive services to assist at discharge as needed  - Refer to Case Management Department for coordinating discharge planning if the patient needs post-hospital services based on physician/advanced practitioner order or complex needs related to functional status, cognitive ability, or social support system  Outcome: Progressing

## 2024-04-19 NOTE — PLAN OF CARE
Problem: PAIN - ADULT  Goal: Verbalizes/displays adequate comfort level or baseline comfort level  Description: Interventions:  - Encourage patient to monitor pain and request assistance  - Assess pain using appropriate pain scale  - Administer analgesics based on type and severity of pain and evaluate response  - Implement non-pharmacological measures as appropriate and evaluate response  - Consider cultural and social influences on pain and pain management  - Notify physician/advanced practitioner if interventions unsuccessful or patient reports new pain  Outcome: Progressing     Problem: INFECTION - ADULT  Goal: Absence or prevention of progression during hospitalization  Description: INTERVENTIONS:  - Assess and monitor for signs and symptoms of infection  - Monitor lab/diagnostic results  - Monitor all insertion sites, i.e. indwelling lines, tubes, and drains  - Monitor endotracheal if appropriate and nasal secretions for changes in amount and color  - [place] appropriate cooling/warming therapies per order  - Administer medications as ordered  - Instruct and encourage patient and family to use good hand hygiene technique  - Identify and instruct in appropriate isolation precautions for identified infection/condition  Outcome: Progressing  Goal: Absence of fever/infection during neutropenic period  Description: INTERVENTIONS:  - Monitor WBC    Outcome: Progressing     Problem: SAFETY ADULT  Goal: Patient will remain free of falls  Description: INTERVENTIONS:  - Educate patient/family on patient safety including physical limitations  - Instruct patient to call for assistance with activity   - Consult OT/PT to assist with strengthening/mobility   - Keep Call bell within reach  - Keep bed low and locked with side rails adjusted as appropriate  - Keep care items and personal belongings within reach  - Initiate and maintain comfort rounds  - Make Fall Risk Sign visible to staff  - Apply yellow socks and bracelet  for high fall risk patients  - Consider moving patient to room near nurses station  Outcome: Progressing  Goal: Maintain or return to baseline ADL function  Description: INTERVENTIONS:  -  Assess patient's ability to carry out ADLs; assess patient's baseline for ADL function and identify physical deficits which impact ability to perform ADLs (bathing, care of mouth/teeth, toileting, grooming, dressing, etc.)  - Assess/evaluate cause of self-care deficits   - Assess range of motion  - Assess patient's mobility; develop plan if impaired  - Assess patient's need for assistive devices and provide as appropriate  - Encourage maximum independence but intervene and supervise when necessary  - Involve family in performance of ADLs  - Assess for home care needs following discharge   - Consider OT consult to assist with ADL evaluation and planning for discharge  - Provide patient education as appropriate  Outcome: Progressing  Goal: Maintains/Returns to pre admission functional level  Description: INTERVENTIONS:  - Perform AM-PAC 6 Click Basic Mobility/ Daily Activity assessment daily.  - Set and communicate daily mobility goal to care team and patient/family/caregiver.   - Collaborate with rehabilitation services on mobility goals if consulted  - Out of bed for toileting  - Record patient progress and toleration of activity level   Outcome: Progressing     Problem: Knowledge Deficit  Goal: Patient/family/caregiver demonstrates understanding of disease process, treatment plan, medications, and discharge instructions  Description: Complete learning assessment and assess knowledge base.  Interventions:  - Provide teaching at level of understanding  - Provide teaching via preferred learning methods  Outcome: Progressing     Problem: DISCHARGE PLANNING  Goal: Discharge to home or other facility with appropriate resources  Description: INTERVENTIONS:  - Identify barriers to discharge w/patient and caregiver  - Arrange for needed  discharge resources and transportation as appropriate  - Identify discharge learning needs (meds, wound care, etc.)  - Arrange for interpretive services to assist at discharge as needed  - Refer to Case Management Department for coordinating discharge planning if the patient needs post-hospital services based on physician/advanced practitioner order or complex needs related to functional status, cognitive ability, or social support system  Outcome: Progressing

## 2024-04-19 NOTE — DISCHARGE SUMMARY
Discharge Summary - Suyapa Mann 33 y.o. female MRN: [id number]    Unit/Bed#: -01 Encounter: [id number]    ADMISSION  Admission Date: 2024   Admitting Attending: Dr. Melissa Ornelas MD  Admitting Diagnoses:   Patient Active Problem List   Diagnosis    Hypothyroidism affecting pregnancy    BMI 26.0-26.9,adult    40 weeks gestation of pregnancy    Preeclampsia without severe features    History of herpes labialis     (spontaneous vaginal delivery)       DELIVERY  Delivery Method: Vaginal, Spontaneous    Delivery Date and Time: 2024  6:54 AM   Delivery Attending: Melissa Ornelas     DISCHARGE  Discharge Date: 24  Discharge Attending: Dr. Goyal Diagnosis:   Same, Delivered    Clinical course: Admission to Delivery  Suyapa Mann is a 33 y.o.  who was admitted at 40w5d for labor. Her initial SVE was 5/90/-1. Her membarnes were in tact. She had spontaneous rupture of membranes with clear fluid. She progressed to complete cervical dilation and began pushing.       Delivery  Route of Delivery: Vaginal, Spontaneous     Anesthesia: Local ,   QBL: Non-Surgical QBL (mL): 568        Delivery: Vaginal, Spontaneous  at 2024  6:54 AM   Laceration: Perineal: 2° , Right sulcus, Repaired? Yes     Baby's Weight: 4100 g (9 lb 0.6 oz) ; 144.62     Apgar scores: 8  and 9  at 1 and 5 minutes, respectively  Pt declined pitocin after delivery. No evidence of uterine atony at time of delivery.     Clinical Course: Post-Delivery:  The post delivery course was preeclampsia without severe features diagnosed on admission. Her CBC, CMP were within normal limits. Her protein creatinine ratio was 0.37. Her blood pressures remained within normal limites during the majority of postpartum period.    On the day of discharge, the patient was ambulating, voiding spontaneously, tolerating oral intake, and hemodynamically stable. She was able to reasonably perform all ADLs. She had appropriate bowel  function. Pain was well-controlled. She was discharged home on postpartum/postop day #2 without complications. Patient was instructed to follow up with her OB as an outpatient and was given appropriate warnings to call her provider with problems or concerns.    Pertinent lab findings included:   Blood type A positive.     Last three Hgb values:  Lab Results   Component Value Date    HGB 13.5 2024    HGB 12.1 2024    HGB 11.7 2024        Problem-specific follow-up plans included the following:  Problem List       Hypothyroidism affecting pregnancy    Current Assessment & Plan     On Levothyroxine 75mcg, ordered         BMI 26.0-26.9,adult    40 weeks gestation of pregnancy    Preeclampsia without severe features    Current Assessment & Plan     Elevated BP at PN visit on , Elevated BP x2 on admit, met criteria on admission   CBC, CM wnl, PC ratio collected on admit 0.37  Asymptomatic  Systolic (12hrs), Av , Min:129 , Max:129   Diastolic (12hrs), Av, Min:73, Max:73           History of herpes labialis    Current Assessment & Plan     On valtrex, no recent outbreaks  No lesions noted on exam         * (Principal)  (spontaneous vaginal delivery) (Chronic)    Current Assessment & Plan     - Pain well controlled with oral analgesics  - Voiding spontaneously  - Tolerating PO fluids and solids  - Ambulating without difficulty  - Encourage breastfeeding and familial bonding             Discharge med list:       Medication List      ASK your doctor about these medications     aspirin 81 mg chewable tablet; Chew 2 tablets (162 mg total) daily Stop   at 36 weeks gestation   levothyroxine 75 mcg tablet; Take 1 tablet (75 mcg total) by mouth every   morning   PRENATAL 1 PO   valACYclovir 500 mg tablet; Commonly known as: VALTREX; Take 1 tablet   (500 mg total) by mouth 2 (two) times a day       Condition at discharge:   good     Disposition:   Home    Planned Readmission:   Eugenie Isaac  Celeste FUNES PGY2

## 2024-04-19 NOTE — LACTATION NOTE
This note was copied from a baby's chart.    In to see family prior to D/C. States baby was cluster feeding ALL night BUT doing well. Review of  positioning and alignment.   Mom is encouraged to:     - Bring baby up to the breast (use of pillows to elevate so baby's torso is against mom's breasts)   - Skin to skin for feedings with top hand exposed to show signs of satiation   - Chin deep into breast tissue (make baby look up to the nipple)   - nose aligned to the nipple   -Wait for wide gape, drag chin on the breast so nipple is aimed at the upper, back palate  - Cheek should be touching breast   - Deep, firm hold of baby with ear, shoulder, hip alignment    Reminded of outpatient support available. Dad remains supportive at bedside.   Mom states she is prepared for discharge.      04/19/24 1200   Maternal Information   Has mother  before? No   Infant to breast within first hour of birth? Yes   Exclusive Pump and Bottle Feed No   LATCH Documentation   Having latch problems? No  (Documented latch score = 8, Encouraged lactation support)   Breasts/Nipples   Intervention Other (comment)  (Review good latch/feed; Lactation support available)   Breastfeeding Progress Not yet established   Breast Pump   Pump 3  (has breast pump @ Home from Insurance)   Patient Follow-Up   Lactation Consult Status 2   Follow-Up Type Inpatient;Call as needed   Other OB Lactation Documentation    Additional Problem Noted Mom states baby nursing better; prepared for D/C; reminded of support available  (has BOTH Booklets @ bedside)     Encouraged parents to call for assistance, questions, and concerns about breastfeeding.  Extension provided.

## 2024-04-19 NOTE — PROGRESS NOTES
"Progress Note - OB/GYN  Suyapa Mann 33 y.o. female MRN: [id number]  Unit/Bed#: -01 Encounter: [id number]    Assessment and Plan     Suyapa Mann is a patient of: [facility]. She is PPD# 2 s/p . Recovering well and is stable.     History of herpes labialis  Assessment & Plan  On valtrex, no recent outbreaks  No lesions noted on exam    Preeclampsia without severe features  Assessment & Plan  Elevated BP at PN visit on , Elevated BP x2 on admit, met criteria on admission   CBC, CM wnl, PC ratio collected on admit 0.37  Asymptomatic  Systolic (12hrs), Av , Min:129 , Max:129   Diastolic (12hrs), Av, Min:73, Max:73      Hypothyroidism affecting pregnancy  Assessment & Plan  On Levothyroxine 75mcg, ordered    *  (spontaneous vaginal delivery)  Assessment & Plan  - Pain well controlled with oral analgesics  - Voiding spontaneously  - Tolerating PO fluids and solids  - Ambulating without difficulty  - Encourage breastfeeding and familial bonding        Disposition    -  Anticipate discharge home on PPD# 2       Subjective/Objective     Chief Complaint: Postpartum State     Subjective:    Suyapa Mann is PPD#2 s/p . She has no current complaints.  Pain is well controlled with medications.  Patient is currently voiding and ambulating without difficulty.  Patient is currently passing flatus, tolerating PO diet, and denies nausea or vomitting. Patient denies fever, chills, chest pain, shortness of breath, or calf tenderness. Lochia is normal. She is Breastfeeding. She is recovering well and is stable.       Vitals:   /73 (BP Location: Right arm)   Pulse 88   Temp 98 °F (36.7 °C) (Oral)   Resp 18   Ht 5' 11\" (1.803 m)   Wt 102 kg (225 lb)   LMP 2023   SpO2 99%   Breastfeeding Yes   BMI 31.38 kg/m²     No intake or output data in the 24 hours ending 24 0651      Physical Exam:   GEN: Suyapa Mann appears well, alert and oriented x 3, pleasant and " cooperative   CARDIO: RRR, no murmurs or rubs  RESP:  CTAB, no wheezes or rales  ABDOMEN: soft, no tenderness, no distention, fundus firm and below umbilicus  EXTREMITIES: SCDs on, non tender, no erythema    Labs:     Hemoglobin   Date Value Ref Range Status   04/17/2024 13.5 11.5 - 15.4 g/dL Final   03/20/2024 12.1 11.7 - 15.5 g/dL Final   01/17/2024 11.7 11.7 - 15.5 g/dL Final   10/05/2023 12.7 11.5 - 15.4 g/dL Final     WBC   Date Value Ref Range Status   04/17/2024 12.86 (H) 4.31 - 10.16 Thousand/uL Final   10/05/2023 6.73 4.31 - 10.16 Thousand/uL Final     White Blood Cell Count   Date Value Ref Range Status   03/20/2024 8.7 3.8 - 10.8 Thousand/uL Final   01/17/2024 7.9 3.8 - 10.8 Thousand/uL Final     Platelet Count   Date Value Ref Range Status   03/20/2024 313 140 - 400 Thousand/uL Final   01/17/2024 271 140 - 400 Thousand/uL Final     Platelets   Date Value Ref Range Status   04/17/2024 326 149 - 390 Thousands/uL Final   10/05/2023 288 149 - 390 Thousands/uL Final     Creatinine   Date Value Ref Range Status   04/17/2024 0.87 0.60 - 1.30 mg/dL Final     Comment:     Standardized to IDMS reference method   03/20/2024 0.75 0.50 - 0.97 mg/dL Final   03/23/2022 0.71 0.40 - 1.10 mg/dL Final     AST   Date Value Ref Range Status   04/17/2024 28 13 - 39 U/L Final   03/20/2024 23 10 - 30 U/L Final   03/23/2022 9 <41 U/L Final     ALT   Date Value Ref Range Status   04/17/2024 24 7 - 52 U/L Final     Comment:     Specimen collection should occur prior to Sulfasalazine administration due to the potential for falsely depressed results.    03/20/2024 20 6 - 29 U/L Final   03/23/2022 12 <56 U/L Final          Marlin Alonso MD  4/19/2024  6:51 AM

## 2024-04-19 NOTE — UTILIZATION REVIEW
"Mother and baby discharged on 2024     NOTIFICATION OF INPATIENT ADMISSION   MATERNITY/DELIVERY AUTHORIZATION REQUEST   SERVICING FACILITY:   [facility] - L&D, , NICU  [address] [place] [address]  Tax ID: [id number]  NPI: [id number] ATTENDING PROVIDER:  Attending Name and NPI#: Melissa Ornelas Md [[id number]]  Address: [address] [place] [address]  Phone: [phone]     ADMISSION INFORMATION:  Place of Service: Inpatient [facility]  Place of Service Code: 21  Inpatient Admission Date/Time: 24  2:23 AM  Discharge Date/Time: 2024 12:45 PM  Admitting Diagnosis Code/Description:  Uterine contractions [O47.9]   Mother: Suyapa Mann 1990 Estimated Date of Delivery: 24  Delivering clinician: Melissa Ornelas    OB History          1    Para   1    Term   1       0    AB   0    Living   1         SAB   0    IAB   0    Ectopic   0    Multiple   0    Live Births   1               [place] Name & MRN:   Information for the patient's :  Lai Mann Boy (Suyapa) [[id number]]   [place] Delivery Information:  Sex: male  Delivered 2024 6:54 AM by Vaginal, Spontaneous; Gestational Age: 40w5d     Measurements:  Weight: 9 lb 0.6 oz (4100 g);  Height: 21.5\"    APGAR 1 minute 5 minutes 10 minutes   Totals: 8 9       UTILIZATION REVIEW CONTACT:  Magui Yang, Utilization   Network Utilization Review Department  Phone: [phone]  Fax [phone]  Email: [email or web address]  Contact for approvals/pending authorizations, clinical reviews, and discharge.   PHYSICIAN ADVISORY SERVICES:  Medical Necessity Denial & [email or web address]-[email or web address] Review  Phone: [phone]  Fax: [phone]  Email: [email or web address]   DISCHARGE SUPPORT TEAM:  For Patients Discharge Needs & Updates  Phone: [phone] opt. 2 Fax: [phone]  Email: [email or web address] "

## 2024-04-19 NOTE — NURSING NOTE
Discharge Paperwork reviewed and signed with patient and FOB. Verbalized understanding with no further questions at this time.

## 2024-04-26 ENCOUNTER — NURSE TRIAGE (OUTPATIENT)
Age: 34
End: 2024-04-26

## 2024-04-26 NOTE — TELEPHONE ENCOUNTER
"Patient called 8 days pp, delivered 24 . C/O #7 lower left abdominal pain. States feels like period cramps and pressure in vagina. Denies fevers, abnormal discharge, burning, frequency.  States having minimal red vaginal bleeding. C/O constipation, had a small BM Saturday and yesterday. States \"feels constipated\". Advised patient to increase water intake, drink coffee, and ambulation.  Patient states she is taking Colace. Advised she may take bisacodyl to produce BM. May take Motrin/Tylenol for pain. Patient is breastfeeding. Advised to call with fevers, foul smelling discharge, increased VB, or unrelieved pain after taking laxative.     Tiger Text routed to on call provider. Agree to escalate bowel regimen.   Reason for Disposition   Mild abdominal pain    Answer Assessment - Initial Assessment Questions  1. LOCATION: \"Where does it hurt?\"       Lower left abdomen  2. RADIATION: \"Does the pain shoot anywhere else?\" (e.g., chest, back)      denies  3. ONSET: \"When did the pain begin?\" (Minutes, hours or days ago)       yesterday  4. DELIVERY DATE: \"When was your delivery date?\" \"Vaginal delivery or ?\"      24  5. ONSET: \"Gradual or sudden onset?\"      Gradual   6. PATTERN \"Does the pain come and go, or has it been constant since it started?\"  (Note: most serious pain is constant and it progresses)       Comes in waves  7. SEVERITY: \"How bad is the pain?\" \"What does it keep you from doing?\"      - MILD -  doesn't interfere with normal activities, abdomen soft and not tender to touch      - MODERATE - interferes with normal activities or awakens from sleep, tender to touch      - SEVERE - patient doesn't want to move, doubled over, abdomen rigid (R/O peritonitis)       7  8. FEVER: \"Do you have a fever?\" If Yes, ask: \"What is your temperature, how was it measured, and when did it start?\"      denies  9. VAGINAL BLEEDING - DISCHARGE: \"Describe any vaginal bleeding or discharge you are having.\" " "(e.g., amount; color; odor)      Minimal red VB  10. OTHER SYMPTOMS: \"Has there been any vomiting, diarrhea, constipation, or urine problems?\"        constipation    Protocols used: Postpartum - Abdominal Pain-ADULT-AH    "

## 2024-04-27 ENCOUNTER — NURSE TRIAGE (OUTPATIENT)
Dept: OTHER | Facility: OTHER | Age: 34
End: 2024-04-27

## 2024-05-04 LAB — PLACENTA IN STORAGE: NORMAL

## 2024-05-07 ENCOUNTER — POSTPARTUM VISIT (OUTPATIENT)
Dept: OBGYN CLINIC | Facility: CLINIC | Age: 34
End: 2024-05-07
Payer: COMMERCIAL

## 2024-05-07 VITALS
WEIGHT: 204.6 LBS | SYSTOLIC BLOOD PRESSURE: 120 MMHG | HEIGHT: 71 IN | DIASTOLIC BLOOD PRESSURE: 74 MMHG | BODY MASS INDEX: 28.64 KG/M2

## 2024-05-07 NOTE — PROGRESS NOTES
"[facility] [address] [place]    Assessment/Plan:  Suyapa Mann is a 33 y.o.  who presents for routine postpartum care.    Postpartum care and examination  - Her perineum has a small area that is still not healed but is well healing and well approximated.  Advised continued pelvic rest and return to office for serial exam in 3 weeks or sooner if having pain.  - She is breastfeeding and it's going well.  - She declines birth control  - Her EDPS screen was negative.  Diagnoses and all orders for this visit:    Postpartum care and examination          Subjective:   Suyapa Mann is a 33 y.o.  female.  CC: postpartum    HPI: Suyapa presents for routine postpartum care. She notes that she did have some difficulty with constipation which is improving with laxatives. She is breastfeeding and notes it is going well. She has some continued perineal/vaginal soreness with prolonged sitting especially on a hard surface, but the otherwise notes minimal pain/        ROS: Review of Systems   Constitutional:  Negative for chills and fever.   Respiratory:  Negative for cough and shortness of breath.    Cardiovascular:  Negative for chest pain and leg swelling.   Gastrointestinal:  Negative for abdominal pain, nausea and vomiting.   Genitourinary:  Negative for dysuria, frequency and urgency.   Neurological:  Negative for dizziness, light-headedness and headaches.       PFSH: The following portions of the patient's history were reviewed and updated as appropriate: allergies, current medications, past family history, past medical history, obstetric history, gynecologic history, past social history, past surgical history and problem list.       Objective:  /74   Ht 5' 11\" (1.803 m)   Wt 92.8 kg (204 lb 9.6 oz)   LMP 2023   Breastfeeding Yes   BMI 28.54 kg/m²    Physical Exam  Constitutional:       Appearance: Normal appearance.   HENT:      Head: " Normocephalic and atraumatic.   Cardiovascular:      Rate and Rhythm: Normal rate.   Pulmonary:      Effort: Pulmonary effort is normal.   Abdominal:      General: There is no distension.      Tenderness: There is no abdominal tenderness. There is no guarding.   Genitourinary:     Exam position: Lithotomy position.      Pubic Area: No rash.       Labia:         Right: No rash, tenderness or lesion.         Left: No rash, tenderness or lesion.       Urethra: No prolapse, urethral swelling or urethral lesion.      Vagina: No vaginal discharge, erythema, tenderness, bleeding or lesions.      Cervix: No cervical motion tenderness, discharge, friability or erythema.      Comments: Perineum is well healing, there is a small area at the vaginal introitus which is well approximated but not completely healed.  Lymphadenopathy:      Lower Body: No right inguinal adenopathy. No left inguinal adenopathy.   Neurological:      Mental Status: She is alert.           Cristina Pollard MD  [facility]  [facility]  5/7/2024 2:22 PM

## 2024-05-07 NOTE — ASSESSMENT & PLAN NOTE
- Her perineum has a small area that is still not healed but is well healing and well approximated.  Advised continued pelvic rest and return to office for serial exam in 3 weeks or sooner if having pain.  - She is breastfeeding and it's going well.  - She declines birth control  - Her EDPS screen was negative.

## 2024-05-20 ENCOUNTER — OFFICE VISIT (OUTPATIENT)
Dept: OBGYN CLINIC | Facility: CLINIC | Age: 34
End: 2024-05-20
Payer: COMMERCIAL

## 2024-05-20 VITALS
HEIGHT: 71 IN | WEIGHT: 205.6 LBS | BODY MASS INDEX: 28.78 KG/M2 | SYSTOLIC BLOOD PRESSURE: 104 MMHG | DIASTOLIC BLOOD PRESSURE: 76 MMHG

## 2024-05-20 PROCEDURE — 99212 OFFICE O/P EST SF 10 MIN: CPT | Performed by: ADVANCED PRACTICE MIDWIFE

## 2024-05-20 NOTE — PROGRESS NOTES
"[facility] [address] [place]    Assessment/Plan:  No problem-specific Assessment & Plan notes found for this encounter.    Diagnoses and all orders for this visit:    Laceration of perineum during delivery, postpartum    - will review with prior provider to see if change in healing process and call with plan      Subjective:   Suyapa Mann is a 34 y.o.  female.  CC: perineal laceration with limited healing    HPI: Suyapa is here to have perineum checked. She notes that last 2 night baby has been nursing with cluster feedings. No change in diet and good fluid intake. Notes that vaginal area feels better. Still notes some pressure with kneeling and squatting. No problems voiding and has resolved most of constipation problem.         ROS: Review of Systems   Constitutional:  Negative for chills and fever.   Respiratory:  Negative for cough and shortness of breath.    Cardiovascular:  Negative for chest pain and palpitations.   Gastrointestinal:  Negative for constipation and diarrhea.   Genitourinary:  Negative for difficulty urinating, frequency, urgency, vaginal discharge and vaginal pain.   Psychiatric/Behavioral:  The patient is not nervous/anxious.        PFSH: The following portions of the patient's history were reviewed and updated as appropriate: allergies, current medications, past family history, past medical history, obstetric history, gynecologic history, past social history, past surgical history and problem list.       Objective:  /76   Ht 5' 11\" (1.803 m)   Wt 93.3 kg (205 lb 9.6 oz)   LMP 2023   Breastfeeding Yes   BMI 28.68 kg/m²    Physical Exam  Vitals reviewed.   Pulmonary:      Effort: Pulmonary effort is normal.   Genitourinary:     Exam position: Lithotomy position.      Comments: Perineal laceration with good granulation tissue at base. There is a flap of tissue creating a bridge and area under flap granulating, but flap of " tissue will probably need to be released.   Neurological:      Mental Status: She is alert.

## 2024-05-21 ENCOUNTER — TELEPHONE (OUTPATIENT)
Dept: OBGYN CLINIC | Facility: CLINIC | Age: 34
End: 2024-05-21

## 2024-05-21 NOTE — TELEPHONE ENCOUNTER
Spoke with Suyapa. Recommendation to get revision of perineal laceration scheduled for Thursday at 11:30. Hx was reviewed with Dr Pollard, will schedule with Dr Pollard.

## 2024-05-23 ENCOUNTER — OFFICE VISIT (OUTPATIENT)
Dept: OBGYN CLINIC | Facility: MEDICAL CENTER | Age: 34
End: 2024-05-23
Payer: COMMERCIAL

## 2024-05-23 VITALS
SYSTOLIC BLOOD PRESSURE: 120 MMHG | WEIGHT: 206.6 LBS | HEIGHT: 71 IN | DIASTOLIC BLOOD PRESSURE: 62 MMHG | BODY MASS INDEX: 28.92 KG/M2

## 2024-05-23 DIAGNOSIS — N89.8 GRANULATION TISSUE OF VAGINA: Primary | ICD-10-CM

## 2024-05-23 PROCEDURE — 99214 OFFICE O/P EST MOD 30 MIN: CPT | Performed by: STUDENT IN AN ORGANIZED HEALTH CARE EDUCATION/TRAINING PROGRAM

## 2024-05-30 PROBLEM — N89.8 GRANULATION TISSUE OF VAGINA: Status: ACTIVE | Noted: 2024-05-30

## 2024-05-30 NOTE — PROGRESS NOTES
"[facility] [address], [place], [place]    Assessment/Plan:  Suyapa Mann is a 34 y.o.  who presents for management of abnormally healing band of vaginal tissue after second degree perineal laceration.    Granulation tissue of vagina  - Under local anesthesia, a band of tissue was excised. An underlying edge of beefy red granulation tissue was cauterized with silver nitrate. The patient tolerated the procedure well. No sutures were required for hemostasis.  - The patient will follow up for repeat in exam in approximately 2 weeks to ensure continued proper healing and tissue approximation.    Diagnoses and all orders for this visit:    Granulation tissue of vagina          Subjective:   Suyapa Mann is a 34 y.o.  female.  CC: abnormal vaginal band of tissue    HPI: Suyapa presents with an abnormally healing band of tissue in the vagina s/p second degree obstetric laceration 6 weeks prior.  She denies pain in the area.        ROS: Review of Systems   Constitutional:  Negative for chills and fever.   Respiratory:  Negative for cough and shortness of breath.    Cardiovascular:  Negative for chest pain and leg swelling.   Gastrointestinal:  Negative for abdominal pain, nausea and vomiting.   Genitourinary:  Negative for dysuria, frequency and urgency.   Neurological:  Negative for dizziness, light-headedness and headaches.       PFSH: The following portions of the patient's history were reviewed and updated as appropriate: allergies, current medications, past family history, past medical history, obstetric history, gynecologic history, past social history, past surgical history and problem list.       Objective:  /62   Ht 5' 11\" (1.803 m)   Wt 93.7 kg (206 lb 9.6 oz)   LMP 2023 (Exact Date)   Breastfeeding Yes   BMI 28.81 kg/m²    Physical Exam  Constitutional:       Appearance: Normal appearance.   HENT:      Head: Normocephalic and " atraumatic.   Cardiovascular:      Rate and Rhythm: Normal rate.   Pulmonary:      Effort: Pulmonary effort is normal.   Abdominal:      General: There is no distension.      Tenderness: There is no abdominal tenderness. There is no guarding.   Genitourinary:     Exam position: Lithotomy position.      Pubic Area: No rash.       Labia:         Right: No rash, tenderness or lesion.         Left: No rash, tenderness or lesion.       Urethra: No prolapse, urethral swelling or urethral lesion.      Vagina: No vaginal discharge, erythema, tenderness, bleeding or lesions.      Cervix: No cervical motion tenderness, discharge, friability or erythema.      Comments: Abnormal band of tissue from 6 o'clock to 8 o'clock in the vagina at the level of the hymen with underlying beefy granulation tissue.  Lymphadenopathy:      Lower Body: No right inguinal adenopathy. No left inguinal adenopathy.   Neurological:      Mental Status: She is alert.           I have spent a total time of 30 minutes on 05/30/24 in caring for this patient including Instructions for management and Patient and family education.      Cristina Pollard MD  [facility]  [facility]  5/30/2024 11:07 AM

## 2024-05-30 NOTE — ASSESSMENT & PLAN NOTE
- Under local anesthesia, a band of tissue was excised. An underlying edge of beefy red granulation tissue was cauterized with silver nitrate. The patient tolerated the procedure well. No sutures were required for hemostasis.  - The patient will follow up for repeat in exam in approximately 2 weeks to ensure continued proper healing and tissue approximation.

## 2024-06-10 ENCOUNTER — OFFICE VISIT (OUTPATIENT)
Dept: OBGYN CLINIC | Facility: CLINIC | Age: 34
End: 2024-06-10

## 2024-06-10 ENCOUNTER — TELEPHONE (OUTPATIENT)
Age: 34
End: 2024-06-10

## 2024-06-10 VITALS
SYSTOLIC BLOOD PRESSURE: 122 MMHG | BODY MASS INDEX: 28.64 KG/M2 | HEIGHT: 71 IN | WEIGHT: 204.6 LBS | DIASTOLIC BLOOD PRESSURE: 68 MMHG

## 2024-06-10 DIAGNOSIS — Z01.419 NORMAL VAGINAL EXAM: Primary | ICD-10-CM

## 2024-06-10 NOTE — PROGRESS NOTES
"[facility] [address] [place]    Assessment/Plan:  Suyapa Mann is a 34 y.o.  who follows up after granualtion tissue excision for follow up exam.  Exam is normal today.  No problem-specific Assessment & Plan notes found for this encounter.    Diagnoses and all orders for this visit:    Normal vaginal exam          Subjective:   Suyapa Mann is a 34 y.o.  female.  CC: Follow up    HPI: Suyapa presents for follow up after excision of abnormal granulation tissue. She is doing well with no concerns.        ROS: Review of Systems   Constitutional:  Negative for chills and fever.   Respiratory:  Negative for cough and shortness of breath.    Cardiovascular:  Negative for chest pain and leg swelling.   Gastrointestinal:  Negative for abdominal pain, nausea and vomiting.   Genitourinary:  Negative for dysuria, frequency and urgency.   Neurological:  Negative for dizziness, light-headedness and headaches.       PFSH: The following portions of the patient's history were reviewed and updated as appropriate: allergies, current medications, past family history, past medical history, obstetric history, gynecologic history, past social history, past surgical history and problem list.       Objective:  /68   Ht 5' 11\" (1.803 m)   Wt 92.8 kg (204 lb 9.6 oz)   LMP 2023 (Exact Date)   Breastfeeding Yes   BMI 28.54 kg/m²    Physical Exam  Constitutional:       Appearance: Normal appearance.   HENT:      Head: Normocephalic and atraumatic.   Cardiovascular:      Rate and Rhythm: Normal rate.   Pulmonary:      Effort: Pulmonary effort is normal.   Abdominal:      General: There is no distension.      Tenderness: There is no abdominal tenderness. There is no guarding.   Genitourinary:     Exam position: Lithotomy position.      Pubic Area: No rash.       Labia:         Right: No rash, tenderness or lesion.         Left: No rash, tenderness or lesion.  "      Urethra: No prolapse, urethral swelling or urethral lesion.      Vagina: No vaginal discharge, erythema, tenderness, bleeding or lesions.      Cervix: No cervical motion tenderness, discharge, friability or erythema.   Lymphadenopathy:      Lower Body: No right inguinal adenopathy. No left inguinal adenopathy.   Neurological:      Mental Status: She is alert.           Cristina Pollard MD  [facility]  [facility]  6/10/2024 4:06 PM

## 2024-06-10 NOTE — TELEPHONE ENCOUNTER
Pt called to update insurance information due to insurance changing and pt was not made aware until she was at appt today. Information was updated and came back pt is eligible.

## 2024-08-21 ENCOUNTER — OFFICE VISIT (OUTPATIENT)
Dept: FAMILY MEDICINE CLINIC | Facility: CLINIC | Age: 34
End: 2024-08-21
Payer: COMMERCIAL

## 2024-08-21 VITALS
DIASTOLIC BLOOD PRESSURE: 70 MMHG | BODY MASS INDEX: 27.44 KG/M2 | HEART RATE: 83 BPM | HEIGHT: 71 IN | WEIGHT: 196 LBS | TEMPERATURE: 97 F | SYSTOLIC BLOOD PRESSURE: 130 MMHG | OXYGEN SATURATION: 99 %

## 2024-08-21 DIAGNOSIS — Z87.59 HISTORY OF PRE-ECLAMPSIA: ICD-10-CM

## 2024-08-21 DIAGNOSIS — E07.9 DISEASE OF THYROID GLAND: ICD-10-CM

## 2024-08-21 DIAGNOSIS — Z13.21 ENCOUNTER FOR VITAMIN DEFICIENCY SCREENING: ICD-10-CM

## 2024-08-21 DIAGNOSIS — N64.59 BREAST ENGORGEMENT: ICD-10-CM

## 2024-08-21 DIAGNOSIS — Z79.899 ENCOUNTER FOR LONG-TERM (CURRENT) USE OF MEDICATIONS: ICD-10-CM

## 2024-08-21 DIAGNOSIS — Z00.00 ANNUAL PHYSICAL EXAM: Primary | ICD-10-CM

## 2024-08-21 DIAGNOSIS — K62.5 ANAL BLEEDING: ICD-10-CM

## 2024-08-21 PROBLEM — O14.90 PREECLAMPSIA: Status: RESOLVED | Noted: 2024-03-19 | Resolved: 2024-08-21

## 2024-08-21 PROBLEM — Z3A.40 40 WEEKS GESTATION OF PREGNANCY: Status: RESOLVED | Noted: 2023-10-04 | Resolved: 2024-08-21

## 2024-08-21 PROCEDURE — 99395 PREV VISIT EST AGE 18-39: CPT | Performed by: FAMILY MEDICINE

## 2024-08-21 PROCEDURE — 99214 OFFICE O/P EST MOD 30 MIN: CPT | Performed by: FAMILY MEDICINE

## 2024-08-21 RX ORDER — LEVOTHYROXINE SODIUM 75 UG/1
75 TABLET ORAL EVERY MORNING
Qty: 100 TABLET | Refills: 1 | Status: SHIPPED | OUTPATIENT
Start: 2024-08-21 | End: 2024-08-26

## 2024-08-21 NOTE — PROGRESS NOTES
Adult Annual Physical  Name: Suyapa Mann      : 1990      MRN: [id number]  Encounter Provider: Evelina Brandt DO  Encounter Date: 2024   Encounter department: [facility]    Assessment & Plan   1. Annual physical exam  2. Disease of thyroid gland  Comments:  stable, cpm  Orders:  -     levothyroxine 75 mcg tablet; Take 1 tablet (75 mcg total) by mouth every morning  -     TSH, 3rd generation; Future  -     T4, free; Future  -     T3; Future  3. Anal bleeding  Comments:  pt did have constipation problem prior to noticing anal bleeding (blood on toilet tissue) - constipation has since resolved  Orders:  -     Ambulatory Referral to Colorectal Surgery; Future  4. Breast feeding status of mother  -     Iron Panel (Includes Ferritin, Iron Sat%, Iron, and TIBC); Future  -     Vitamin B12; Future  -     Vitamin D 25 hydroxy; Future  5. Breast engorgement  Comments:  advised pump or nurse baby more frequently, warm compresses or warm shower  6. History of pre-eclampsia  -     Iron Panel (Includes Ferritin, Iron Sat%, Iron, and TIBC); Future  -     Vitamin B12; Future  -     Vitamin D 25 hydroxy; Future  7. Encounter for vitamin deficiency screening  -     Vitamin B12; Future  -     Vitamin D 25 hydroxy; Future  8. Encounter for long-term (current) use of medications  -     Iron Panel (Includes Ferritin, Iron Sat%, Iron, and TIBC); Future  -     Vitamin B12; Future  -     Vitamin D 25 hydroxy; Future  Immunizations and preventive care screenings were discussed with patient today. Appropriate education was printed on patient's after visit summary.      Depression Screening and Follow-up Plan: Patient was screened for depression during today's encounter. They screened negative with a PHQ-2 score of 0.      Chief Complaint   Patient presents with    Physical Exam    Follow-up       History of Present Illness     Adult Annual Physical:  Patient presents for annual physical. Also, pt  "states her Lactation consultant had recommended getting thyroid labs rechecked and checking Iron, vit D and B12  Also c/o \"Joints have felt weak\" since pregnancy  Delivered April 17th - this was pt's first pregnancy   States, \"Went to chiropractor after delivery and he had also said get iron checked\"  Also shows inner breasts 2 areas that get spots of breast engorgement, but are localized to one spot inner breast each time - no pain or tenderness- wonders if it might have something to do with the nursing bra she has been wearing.     Diet and Physical Activity:  - Diet/Nutrition: well balanced diet.    Depression Screening:  - PHQ-2 Score: 0    General Health:    - Hearing: normal hearing bilateral ears.  - Dental: regular dental visits.    /GYN Health:  - Follows with GYN: yes.     Review of Systems   Constitutional: Negative.    Respiratory: Negative.     Cardiovascular: Negative.    Gastrointestinal:  Positive for anal bleeding (Had some constipation issues postpartum, resolved now, but occasionally will bleed slightly- bright red blood on toilet paper). Negative for abdominal distention, abdominal pain, blood in stool, diarrhea, nausea, rectal pain and vomiting.   Neurological: Negative.    Hematological: Negative.          Objective     /70 (BP Location: Left arm, Patient Position: Sitting, Cuff Size: Standard)   Pulse 83   Temp (!) 97 °F (36.1 °C) (Tympanic)   Ht 5' 11\" (1.803 m)   Wt 88.9 kg (196 lb)   SpO2 99%   BMI 27.34 kg/m²     Physical Exam  Vitals and nursing note reviewed.   Constitutional:       General: She is not in acute distress.     Appearance: She is well-groomed. She is not ill-appearing, toxic-appearing or diaphoretic.   HENT:      Head: Normocephalic and atraumatic.      Right Ear: Tympanic membrane, ear canal and external ear normal.      Left Ear: Tympanic membrane, ear canal and external ear normal.      Nose: Nose normal.      Mouth/Throat:      Lips: Pink.      Mouth: " Mucous membranes are moist.      Pharynx: Oropharynx is clear. Uvula midline.      Tonsils: 0 on the right. 0 on the left.   Eyes:      General: Lids are normal.      Extraocular Movements: Extraocular movements intact.      Conjunctiva/sclera: Conjunctivae normal.      Pupils: Pupils are equal, round, and reactive to light.   Neck:      Thyroid: No thyroid mass, thyromegaly or thyroid tenderness.      Vascular: No JVD.      Trachea: Trachea and phonation normal.   Cardiovascular:      Rate and Rhythm: Normal rate and regular rhythm.      Pulses: Normal pulses.      Heart sounds: Normal heart sounds.   Pulmonary:      Effort: Pulmonary effort is normal.      Breath sounds: Normal breath sounds and air entry.   Chest:          Comments: Pt keeps her bra in place and shows - Right inner breast skin colored linear thick worm-like subcutaneous prominence with the appearance of engorged milk duct; no erythema or heat  - Left inner breast faintly pink patch, no swelling or heat  Abdominal:      General: Bowel sounds are normal. There is no distension or abdominal bruit.      Palpations: Abdomen is soft. There is no hepatomegaly, splenomegaly or mass.      Tenderness: There is no abdominal tenderness.      Hernia: There is no hernia in the ventral area.   Musculoskeletal:      Cervical back: Neck supple.      Right lower leg: No edema.      Left lower leg: No edema.   Lymphadenopathy:      Cervical: No cervical adenopathy.   Skin:     General: Skin is warm and dry.      Capillary Refill: Capillary refill takes less than 2 seconds.      Coloration: Skin is not pale.   Neurological:      Mental Status: She is alert and oriented to person, place, and time.      Cranial Nerves: Cranial nerves 2-12 are intact.      Sensory: Sensation is intact.      Motor: Motor function is intact.      Coordination: Coordination is intact.      Gait: Gait normal.      Deep Tendon Reflexes: Reflexes are normal and symmetric.   Psychiatric:          Mood and Affect: Mood normal.         Behavior: Behavior normal. Behavior is cooperative.

## 2024-08-21 NOTE — PATIENT INSTRUCTIONS
"Patient Education     Routine physical for adults   The Basics   Written by the doctors and editors at [facility]   What is a physical? -- A physical is a routine visit, or \"check-up,\" with your doctor. You might also hear it called a \"wellness visit\" or \"preventive visit.\"  During each visit, the doctor will:   Ask about your physical and mental health   Ask about your habits, behaviors, and lifestyle   Do an exam   Give you vaccines if needed   Talk to you about any medicines you take   Give advice about your health   Answer your questions  Getting regular check-ups is an important part of taking care of your health. It can help your doctor find and treat any problems you have. But it's also important for preventing health problems.  A routine physical is different from a \"sick visit.\" A sick visit is when you see a doctor because of a health concern or problem. Since physicals are scheduled ahead of time, you can think about what you want to ask the doctor.  How often should I get a physical? -- It depends on your age and health. In general, for people age 21 years and older:   If you are younger than 50 years, you might be able to get a physical every 3 years.   If you are 50 years or older, your doctor might recommend a physical every year.  If you have an ongoing health condition, like diabetes or high blood pressure, your doctor will probably want to see you more often.  What happens during a physical? -- In general, each visit will include:   Physical exam - The doctor or nurse will check your height, weight, heart rate, and blood pressure. They will also look at your eyes and ears. They will ask about how you are feeling and whether you have any symptoms that bother you.   Medicines - It's a good idea to bring a list of all the medicines you take to each doctor visit. Your doctor will talk to you about your medicines and answer any questions. Tell them if you are having any side effects that bother you. You " "should also tell them if you are having trouble paying for any of your medicines.   Habits and behaviors - This includes:   Your diet   Your exercise habits   Whether you smoke, drink alcohol, or use drugs   Whether you are sexually active   Whether you feel safe at home  Your doctor will talk to you about things you can do to improve your health and lower your risk of health problems. They will also offer help and support. For example, if you want to quit smoking, they can give you advice and might prescribe medicines. If you want to improve your diet or get more physical activity, they can help you with this, too.   Lab tests, if needed - The tests you get will depend on your age and situation. For example, your doctor might want to check your:   Cholesterol   Blood sugar   Iron level   Vaccines - The recommended vaccines will depend on your age, health, and what vaccines you already had. Vaccines are very important because they can prevent certain serious or deadly infections.   Discussion of screening - \"Screening\" means checking for diseases or other health problems before they cause symptoms. Your doctor can recommend screening based on your age, risk, and preferences. This might include tests to check for:   Cancer, such as breast, prostate, cervical, ovarian, colorectal, prostate, lung, or skin cancer   Sexually transmitted infections, such as chlamydia and gonorrhea   Mental health conditions like depression and anxiety  Your doctor will talk to you about the different types of screening tests. They can help you decide which screenings to have. They can also explain what the results might mean.   Answering questions - The physical is a good time to ask the doctor or nurse questions about your health. If needed, they can refer you to other doctors or specialists, too.  Adults older than 65 years often need other care, too. As you get older, your doctor will talk to you about:   How to prevent falling at " home   Hearing or vision tests   Memory testing   How to take your medicines safely   Making sure that you have the help and support you need at home  All topics are updated as new evidence becomes available and our peer review process is complete.  This topic retrieved from [employer] on: May 02, 2024.  Topic 279245 Version 1.0  Release: 32.4.3 - C32.122  © 2024 UpToDate, Inc. and/or its affiliates. All rights reserved.  Consumer Information Use and Disclaimer   Disclaimer: This generalized information is a limited summary of diagnosis, treatment, and/or medication information. It is not meant to be comprehensive and should be used as a tool to help the user understand and/or assess potential diagnostic and treatment options. It does NOT include all information about conditions, treatments, medications, side effects, or risks that may apply to a specific patient. It is not intended to be medical advice or a substitute for the medical advice, diagnosis, or treatment of a health care provider based on the health care provider's examination and assessment of a patient's specific and unique circumstances. Patients must speak with a health care provider for complete information about their health, medical questions, and treatment options, including any risks or benefits regarding use of medications. This information does not endorse any treatments or medications as safe, effective, or approved for treating a specific patient. UpToDate, Inc. and its affiliates disclaim any warranty or liability relating to this information or the use thereof.The use of this information is governed by the Terms of Use, available at https://www.woltersWebcollageuwer.com/en/know/clinical-effectiveness-terms. 2024© UpToDate, Inc. and its affiliates and/or licensors. All rights reserved.  Copyright   © 2024 UpToDate, Inc. and/or its affiliates. All rights reserved.

## 2024-08-22 ENCOUNTER — APPOINTMENT (OUTPATIENT)
Dept: LAB | Facility: CLINIC | Age: 34
End: 2024-08-22
Payer: COMMERCIAL

## 2024-08-22 DIAGNOSIS — Z79.899 ENCOUNTER FOR LONG-TERM (CURRENT) USE OF MEDICATIONS: ICD-10-CM

## 2024-08-22 DIAGNOSIS — E07.9 DISEASE OF THYROID GLAND: ICD-10-CM

## 2024-08-22 DIAGNOSIS — Z13.21 ENCOUNTER FOR VITAMIN DEFICIENCY SCREENING: ICD-10-CM

## 2024-08-22 DIAGNOSIS — Z87.59 HISTORY OF PRE-ECLAMPSIA: ICD-10-CM

## 2024-08-22 LAB
25(OH)D3 SERPL-MCNC: 57.4 NG/ML (ref 30–100)
FERRITIN SERPL-MCNC: 11 NG/ML (ref 11–307)
IRON SATN MFR SERPL: 22 % (ref 15–50)
IRON SERPL-MCNC: 76 UG/DL (ref 50–212)
T3 SERPL-MCNC: 1 NG/ML
T4 FREE SERPL-MCNC: 1.17 NG/DL (ref 0.61–1.12)
TIBC SERPL-MCNC: 339 UG/DL (ref 250–450)
TSH SERPL DL<=0.05 MIU/L-ACNC: <0.01 UIU/ML (ref 0.45–4.5)
UIBC SERPL-MCNC: 263 UG/DL (ref 155–355)
VIT B12 SERPL-MCNC: 407 PG/ML (ref 180–914)

## 2024-08-22 PROCEDURE — 82728 ASSAY OF FERRITIN: CPT

## 2024-08-22 PROCEDURE — 84480 ASSAY TRIIODOTHYRONINE (T3): CPT

## 2024-08-22 PROCEDURE — 83540 ASSAY OF IRON: CPT

## 2024-08-22 PROCEDURE — 84443 ASSAY THYROID STIM HORMONE: CPT

## 2024-08-22 PROCEDURE — 84439 ASSAY OF FREE THYROXINE: CPT

## 2024-08-22 PROCEDURE — 83550 IRON BINDING TEST: CPT

## 2024-08-22 PROCEDURE — 82306 VITAMIN D 25 HYDROXY: CPT

## 2024-08-22 PROCEDURE — 82607 VITAMIN B-12: CPT

## 2024-08-22 PROCEDURE — 36415 COLL VENOUS BLD VENIPUNCTURE: CPT

## 2024-08-26 ENCOUNTER — TELEPHONE (OUTPATIENT)
Dept: FAMILY MEDICINE CLINIC | Facility: CLINIC | Age: 34
End: 2024-08-26

## 2024-08-26 DIAGNOSIS — E07.9 DISEASE OF THYROID GLAND: ICD-10-CM

## 2024-08-26 DIAGNOSIS — Z79.899 ENCOUNTER FOR LONG-TERM (CURRENT) USE OF MEDICATIONS: ICD-10-CM

## 2024-08-26 DIAGNOSIS — R79.0 LOW FERRITIN LEVEL: Primary | ICD-10-CM

## 2024-08-26 DIAGNOSIS — E03.9 HYPOTHYROIDISM, UNSPECIFIED TYPE: ICD-10-CM

## 2024-08-26 RX ORDER — LEVOTHYROXINE SODIUM 75 UG/1
75 TABLET ORAL
Qty: 100 TABLET | Refills: 1 | Status: SHIPPED | OUTPATIENT
Start: 2024-08-26

## 2024-08-26 NOTE — TELEPHONE ENCOUNTER
)Patient called back and message was read back word for word.  She will get her labs on or Bout 10/7/24. She verbally understood.

## 2024-10-10 ENCOUNTER — APPOINTMENT (OUTPATIENT)
Dept: LAB | Facility: CLINIC | Age: 34
End: 2024-10-10
Payer: COMMERCIAL

## 2024-10-10 DIAGNOSIS — E07.9 DISEASE OF THYROID GLAND: ICD-10-CM

## 2024-10-10 DIAGNOSIS — R79.0 LOW FERRITIN LEVEL: ICD-10-CM

## 2024-10-10 DIAGNOSIS — E03.9 HYPOTHYROIDISM, UNSPECIFIED TYPE: ICD-10-CM

## 2024-10-10 DIAGNOSIS — Z79.899 ENCOUNTER FOR LONG-TERM (CURRENT) USE OF MEDICATIONS: ICD-10-CM

## 2024-10-10 LAB
FERRITIN SERPL-MCNC: 27 NG/ML (ref 11–307)
IRON SATN MFR SERPL: 16 % (ref 15–50)
IRON SERPL-MCNC: 51 UG/DL (ref 50–212)
T4 FREE SERPL-MCNC: 0.76 NG/DL (ref 0.61–1.12)
TIBC SERPL-MCNC: 327 UG/DL (ref 250–450)
TSH SERPL DL<=0.05 MIU/L-ACNC: 4.1 UIU/ML (ref 0.45–4.5)
UIBC SERPL-MCNC: 276 UG/DL (ref 155–355)

## 2024-10-10 PROCEDURE — 82728 ASSAY OF FERRITIN: CPT

## 2024-10-10 PROCEDURE — 83540 ASSAY OF IRON: CPT

## 2024-10-10 PROCEDURE — 83550 IRON BINDING TEST: CPT

## 2024-10-10 PROCEDURE — 84439 ASSAY OF FREE THYROXINE: CPT

## 2024-10-10 PROCEDURE — 36415 COLL VENOUS BLD VENIPUNCTURE: CPT

## 2024-10-10 PROCEDURE — 84443 ASSAY THYROID STIM HORMONE: CPT

## 2024-11-05 ENCOUNTER — ANNUAL EXAM (OUTPATIENT)
Dept: OBGYN CLINIC | Facility: CLINIC | Age: 34
End: 2024-11-05
Payer: COMMERCIAL

## 2024-11-05 VITALS
BODY MASS INDEX: 27.44 KG/M2 | HEIGHT: 71 IN | SYSTOLIC BLOOD PRESSURE: 112 MMHG | WEIGHT: 196 LBS | DIASTOLIC BLOOD PRESSURE: 72 MMHG

## 2024-11-05 DIAGNOSIS — Z01.419 ENCOUNTER FOR GYNECOLOGICAL EXAMINATION WITHOUT ABNORMAL FINDING: Primary | ICD-10-CM

## 2024-11-05 PROCEDURE — G0145 SCR C/V CYTO,THINLAYER,RESCR: HCPCS | Performed by: ADVANCED PRACTICE MIDWIFE

## 2024-11-05 PROCEDURE — 99395 PREV VISIT EST AGE 18-39: CPT | Performed by: ADVANCED PRACTICE MIDWIFE

## 2024-11-05 NOTE — PROGRESS NOTES
[facility] [address] Daniel Knutson PA    ASSESSMENT/PLAN: Suyapa Mann is a 34 y.o.  who presents for annual gynecologic exam.    Encounter for routine gynecologic examination  - Routine well woman exam completed today.  - Cervical Cancer Screening: Current ASCCP Guidelines reviewed. Last Pap: 2022 normal. Next Pap Due: plans child next year, will get pap smear today  - HPV Vaccination status: Not immunized  - STI screening offered including HIV testing: not indicated based on hx or requested at time of visit  - Contraceptive counseling discussed.  Current contraception: none  - RTO 1 yr     Additional problems addressed during this visit:  1. Encounter for gynecological examination without abnormal finding  -     Liquid-based pap, screening; Future      CC:  Annual Gynecologic Examination    HPI: Suyapa Mann is a 34 y.o.  who presents for annual gynecologic examination.  Suyapa presents for gyn exam today. 10/29/2024 LMP. Menses is becoming monthly, flow heavy - moderate, lasting 5 days. Using no birth control method. Planning pregnancy in the next year, currently breastfeeding. Last pap smear . History of abnormal pap smear- LGSIL. Sexually active- yes, with partner for 6 yrs. HPV vaccine - no. Does not desire STI testing. 6 hrs sleep per day. 2-3 servings of calcium rich food per day. Walks a few days per week. 1-2 servings of caffeine per day. Breast changes - breastfeeding. Safe at home - yes. Concerns - none          The following portions of the patient's history were reviewed and updated as appropriate: allergies, current medications, past family history, past medical history, obstetric history, gynecologic history, past social history, past surgical history and problem list.    Review of Systems   Constitutional:  Negative for chills, fatigue and fever.   Respiratory:  Negative for cough and shortness of breath.    Cardiovascular:   "Negative for chest pain, palpitations and leg swelling.   Gastrointestinal:  Negative for constipation and diarrhea.   Genitourinary:  Negative for difficulty urinating, dyspareunia, dysuria, frequency, menstrual problem, pelvic pain, urgency, vaginal bleeding, vaginal discharge and vaginal pain.   Neurological:  Negative for light-headedness and headaches.   Psychiatric/Behavioral:  Negative for self-injury. The patient is not nervous/anxious.          Objective:  /72   Ht 5' 11\" (1.803 m)   Wt 88.9 kg (196 lb)   LMP 10/29/2024 (Exact Date)   Breastfeeding Yes   BMI 27.34 kg/m²    Physical Exam  Vitals reviewed.   Constitutional:       Appearance: Normal appearance.   HENT:      Head: Normocephalic.   Neck:      Thyroid: No thyroid mass or thyroid tenderness.   Cardiovascular:      Rate and Rhythm: Normal rate and regular rhythm.      Heart sounds: Normal heart sounds.   Pulmonary:      Effort: Pulmonary effort is normal.      Breath sounds: Normal breath sounds.   Chest:   Breasts:     Right: No mass, nipple discharge, skin change or tenderness.      Left: No mass, nipple discharge, skin change or tenderness.   Abdominal:      General: There is no distension.      Palpations: There is no mass.      Tenderness: There is no abdominal tenderness. There is no guarding.   Genitourinary:     General: Normal vulva.      Exam position: Lithotomy position.      Labia:         Right: No rash, tenderness or lesion.         Left: No rash, tenderness or lesion.       Urethra: No urethral pain, urethral swelling or urethral lesion.      Vagina: No vaginal discharge, erythema, tenderness, bleeding or lesions.      Cervix: No cervical motion tenderness, discharge, friability, lesion, erythema or cervical bleeding.      Uterus: Normal. Not enlarged and not tender.       Adnexa:         Right: No mass, tenderness or fullness.          Left: No mass, tenderness or fullness.     Musculoskeletal:      Cervical back: Normal " range of motion.   Lymphadenopathy:      Upper Body:      Right upper body: No axillary adenopathy.      Left upper body: No axillary adenopathy.   Skin:     General: Skin is warm and dry.   Neurological:      Mental Status: She is alert.   Psychiatric:         Mood and Affect: Mood normal.         Behavior: Behavior normal.         Judgment: Judgment normal.

## 2024-11-11 LAB
LAB AP GYN PRIMARY INTERPRETATION: NORMAL
Lab: NORMAL

## 2025-03-26 DIAGNOSIS — E03.9 HYPOTHYROIDISM, UNSPECIFIED TYPE: Primary | ICD-10-CM

## 2025-03-26 DIAGNOSIS — Z86.39 HISTORY OF IRON DEFICIENCY: ICD-10-CM

## 2025-03-26 DIAGNOSIS — Z13.1 ENCOUNTER FOR SCREENING EXAMINATION FOR IMPAIRED GLUCOSE REGULATION AND DIABETES MELLITUS: ICD-10-CM

## 2025-03-26 DIAGNOSIS — Z13.220 LIPID SCREENING: ICD-10-CM

## 2025-03-26 DIAGNOSIS — Z13.0 SCREENING FOR DEFICIENCY ANEMIA: ICD-10-CM

## 2025-04-01 DIAGNOSIS — E07.9 DISEASE OF THYROID GLAND: ICD-10-CM

## 2025-04-02 RX ORDER — LEVOTHYROXINE SODIUM 75 UG/1
75 TABLET ORAL EVERY MORNING
Qty: 100 TABLET | Refills: 0 | Status: SHIPPED | OUTPATIENT
Start: 2025-04-02

## 2025-05-12 ENCOUNTER — TELEPHONE (OUTPATIENT)
Age: 35
End: 2025-05-12

## 2025-05-12 NOTE — TELEPHONE ENCOUNTER
Patient calling back to schedule D&V LMP 4/10/25 or 4/11/25, [facility]ts [facility], [facility] Please call patient back to schedule appt.

## 2025-05-12 NOTE — TELEPHONE ENCOUNTER
Incoming call from patient, calling to schedule first prenatal appointment. LMP 4/10 or 4/11. UPT + yesterday, 5/11.     Warm transfer to office to assist with scheduling.

## 2025-05-13 NOTE — TELEPHONE ENCOUNTER
PT called back in did not feel comfortable waiting till July for D&V so she wanted to switch locations and is now scheduled at [facility].

## 2025-05-16 ENCOUNTER — NURSE TRIAGE (OUTPATIENT)
Age: 35
End: 2025-05-16

## 2025-05-16 NOTE — TELEPHONE ENCOUNTER
"FOLLOW UP: Routing to on call provider as agueda.    REASON FOR CONVERSATION: Vaginitis    SYMPTOMS: Vaginal discharge. External symptoms - mild swelling, redness, mild vaginal pain.    OTHER: 5w1d OB patient reports vaginal yeast infection symptoms. States she has a hx of the same. Advised she treat with OTC 7 day monistat. Patient should call back if no improvement in symptoms after treatment or with worsening symptoms. Patient verbalized understanding.     Patient also asking if she needs beta hcg level prior to D&V. She denies concerns for bleeding or pain. Informed patient beta hcg level not routinely needed and patient verbalized understanding.     DISPOSITION: Home Care  Reason for Disposition   Symptoms of a yeast infection (i.e., itchy, white discharge, not bad smelling) and feels like prior vaginal yeast infections    Answer Assessment - Initial Assessment Questions  1. SYMPTOM: \"What's the main symptom you're concerned about?\" (e.g., pain, itching, dryness)      Discharge, mild swelling and redness  2. LOCATION: \"Where is the  s/s located?\" (e.g., inside/outside, left/right)      External   3. ONSET: \"When did the  s/s  start?\"      4 days ago  4. PAIN: \"Is there any pain?\" If Yes, ask: \"How bad is it?\" (Scale: 1-10; mild, moderate, severe)      Slight pain  5. ITCHING: \"Is there any itching?\" If Yes, ask: \"How bad is it?\" (Scale: 1-10; mild, moderate, severe)      Slight itching  6. CAUSE: \"What do you think is causing the discharge?\" \"Have you had the same problem before?\" \"What happened then?\"      Yeast infection - similar to previous infection  7. OTHER SYMPTOMS: \"Do you have any other symptoms?\" (e.g., fever, itching, vaginal bleeding, pain with urination, injury to genital area, vaginal foreign body)      Denies  8. PREGNANCY: \"Is there any chance you are pregnant?\" \"When was your last menstrual period?\"      LMP 4/10/25    Protocols used: Vaginal Symptoms-Adult-OH  \"How many yeast infections have you " "had in the past 2 years?\"    -If 1 or less, prescribe Diflucan 150mg PO. If no improvement after 1 dose treatment, please instruct patient to call back to schedule appointment. (should be seen within 3 days)    -If more than 4 or more yeast infections in a year or if they are recurrent, schedule appointment within 3 days.    For OB patients: if patient wishes to use over the counter monistat, recommend only the 7 day treatment.   "

## 2025-05-16 NOTE — TELEPHONE ENCOUNTER
Patient called to verify that she can use Monistat while pregnant and what if its not a yeast infection.  Advised patient her symptoms sound like a yeast infection and she should use the treatment. Encouraged calling back if symptoms worsening, did not improve or she had any new symptoms. Patient verbalized understanding.

## 2025-06-12 ENCOUNTER — ULTRASOUND (OUTPATIENT)
Dept: OBGYN CLINIC | Facility: CLINIC | Age: 35
End: 2025-06-12
Payer: COMMERCIAL

## 2025-06-12 ENCOUNTER — TELEPHONE (OUTPATIENT)
Age: 35
End: 2025-06-12

## 2025-06-12 VITALS — DIASTOLIC BLOOD PRESSURE: 84 MMHG | SYSTOLIC BLOOD PRESSURE: 110 MMHG | WEIGHT: 180.6 LBS | BODY MASS INDEX: 25.19 KG/M2

## 2025-06-12 DIAGNOSIS — Z32.01 POSITIVE PREGNANCY TEST: Primary | ICD-10-CM

## 2025-06-12 PROCEDURE — PNV: Performed by: PHYSICIAN ASSISTANT

## 2025-06-12 PROCEDURE — 99213 OFFICE O/P EST LOW 20 MIN: CPT | Performed by: PHYSICIAN ASSISTANT

## 2025-06-12 PROCEDURE — 76817 TRANSVAGINAL US OBSTETRIC: CPT | Performed by: PHYSICIAN ASSISTANT

## 2025-06-12 NOTE — PROGRESS NOTES
Early ultrasound   LMP: 04/10/2025  LINDA: 1/15/2026  GA: 9w0d  Patient reports some nausea.   Denies any bleeding, Leaking of fluid or cramping   Planned pregnancy   Regular menses and periods last 5 days. Cycles are every 28 days.       Ultrasound Probe Disinfection    A transvaginal ultrasound was performed.   Prior to use, disinfection was performed with High Level Disinfection Process ([employer]AG[employer]on)  Probe serial number [id number]: [id number] was used

## 2025-06-12 NOTE — TELEPHONE ENCOUNTER
Patient called to schedule dating US with [facility], no available appts until 7/10/25. Warm transfer to [facility], unsuccessful. Please call patient back to schedule dating US.

## 2025-06-12 NOTE — PROGRESS NOTES
Name: Suyapa Mann      : 1990      MRN: [id number]  Encounter Provider: Stephanie Hopper PA-C  Encounter Date: 2025   Encounter department: [facility] [place]  :  Assessment & Plan  Positive pregnancy test  Early pregnancy at 8w2d with a calculated LINDA of 25 based on today's ultrasound     - Genetic screening options reviewed. She is interested in NIPT, so MFM referral placed  - Prenatal care reviewed  - Pregnancy precautions reviewed  - Prenatal Vitamin recommended.       RTO for OB interview and PN-1 visit    Orders:    Ambulatory Referral to Maternal Fetal Medicine; Future    [facility] OB < 14 weeks single or first gestation level 1        History of Present Illness   Suyapa Mann is a 35 y.o.  presenting today for verification of pregnancy.     Menses are regular, monthly. She does not track intervals. LMP approx 4/10 or , which puts her at about 9w0d and LINDA of 1/15/25 by LMP.  This pregnancy was planned    She is accompanied by herself.     Reports nausea and fatigue  Denies bleeding, cramping, or vomiting.     OB hx significant for:      2024     Taking a prenatal vitamin.       Ultrasound Probe Disinfection    A transvaginal ultrasound was performed.   Prior to use, disinfection was performed with High Level Disinfection Process ([employer])  Probe serial number [id number]: [id number] was used            Review of Systems   Constitutional:  Positive for fatigue. Negative for fever.   Gastrointestinal:  Positive for nausea. Negative for vomiting.   Genitourinary:  Negative for pelvic pain and vaginal bleeding.   Neurological:  Negative for dizziness, light-headedness and headaches.          Objective   /84 (BP Location: Left arm, Patient Position: Sitting, Cuff Size: Standard)   Wt 81.9 kg (180 lb 9.6 oz)   LMP 04/10/2025   Breastfeeding Yes   BMI 25.19 kg/m²      Physical Exam  Vitals and nursing note  reviewed.   Constitutional:       General: She is not in acute distress.     Appearance: Normal appearance. She is not ill-appearing.   HENT:      Head: Normocephalic and atraumatic.     Eyes:      Conjunctiva/sclera: Conjunctivae normal.     Pulmonary:      Effort: Pulmonary effort is normal.     Skin:     General: Skin is warm and dry.     Neurological:      General: No focal deficit present.      Mental Status: She is alert.     Psychiatric:         Mood and Affect: Mood normal.         Behavior: Behavior normal.

## 2025-06-13 ENCOUNTER — TELEPHONE (OUTPATIENT)
Age: 35
End: 2025-06-13

## 2025-06-13 NOTE — TELEPHONE ENCOUNTER
Patient inquiring at what point will her first trimester blood work/urine tests be ordered.     Advised these labs will be ordered during her nurse intake on 6/26. Will complete labs at out patient lab.

## 2025-06-23 ENCOUNTER — TELEPHONE (OUTPATIENT)
Age: 35
End: 2025-06-23

## 2025-06-23 NOTE — TELEPHONE ENCOUNTER
Incoming call from patient - calling to confirm that appointment scheduled for 6/26 will be phone appointment. Advised per appointment note that it is a phone call. Patient verbalized understanding.

## 2025-06-26 ENCOUNTER — INITIAL PRENATAL (OUTPATIENT)
Dept: OBGYN CLINIC | Facility: CLINIC | Age: 35
End: 2025-06-26

## 2025-06-26 VITALS — HEIGHT: 71 IN | BODY MASS INDEX: 25.2 KG/M2 | WEIGHT: 180 LBS

## 2025-06-26 DIAGNOSIS — Z34.81 PRENATAL CARE, SUBSEQUENT PREGNANCY, FIRST TRIMESTER: Primary | ICD-10-CM

## 2025-06-26 PROCEDURE — OBC

## 2025-06-26 NOTE — PATIENT INSTRUCTIONS
Congratulations!! Please review our Pregnancy Essential Guide and [facility] L&D Virtual tour from our networks website.     [facility] Pregnancy Essentials Guide  [facility] ([email or web address])     [facility] ([employer])

## 2025-06-26 NOTE — PROGRESS NOTES
"  OB INTAKE INTERVIEW  Patient is 35 y.o. who presents for OB intake at 10 wks  She is accompanied by herself during this telephone encounter  The father of her baby (RAYMOND Mann) is involved in the pregnancy and is 33 years old.      Last Menstrual Period: 4/10/25 approx  Ultrasound: Measured 8 weeks 2 days on   Estimated Date of Delivery: 26 changed by  8 week US    Signs/Symptoms of Pregnancy  Current pregnancy symptoms: \"feeling great\"- a little more nausea than last time (mornings only), fatigued  Constipation no  Headaches no  Cramping/spotting no  PICA cravings no    Diabetes-  Body mass index is 25.1 kg/m².  If patient has 1 or more, please order early 1 hour GTT  History of GDM no  BMI >35 no  History of PCOS or current metformin use (should stop for 7 days prior to 1hr GTT unless pre-existing diabetes)  no  History of LGA/macrosomic infant (4000g/9lbs) YES    If patient has 2 or more, please order early 1 hour GTT  BMI>30 no  AMA YES  First degree relative with type 2 diabetes no  History of chronic HTN, hyperlipidemia, elevated A1C no  High risk race (, , ,  or ) no    Hypertension- if you answer yes to any of the following, please order baseline preeclampsia labs (cbc, comprehensive metabolic panel, urine protein creatinine ratio, uric acid)  History of of chronic HTN no  History of gestational HTN no  History of preeclampsia, eclampsia, or HELLP syndrome YES  History of diabetes no  History of lupus,sjogrens syndrome, kidney disease no    Thyroid- if yes order TSH with reflex T4  History of thyroid disease YES    Bleeding Disorder or Hx of DVT-patient or first degree relative with history of. Order the following if not done previously.   (Factor V, antithrombin III, prothrombin gene mutation, protein C and S Ag, lupus anticoagulant, anticardiolipin, beta-2 glycoprotein)   no    OB/GYN-  History of abnormal pap smear YES       Date " of last pap smear 24  History of HPV no  History of Herpes/HSV YES  History of other STI (gonorrhea, chlamydia, trich) no  History of prior  YES  History of prior  no  History of  delivery prior to 36 weeks 6 days no  History of Varicella or Vaccination had disease  History of blood transfusion no  Ok for blood transfusion YES    Substance screening-   History of tobacco use YES  Currently using tobacco no  Substance Use Screen Level (N/A, LOW, HIGH) NA    MRSA Screening-   Does the pt have a hx of MRSA? no    Immunizations:  Influenza vaccine given this season no  Discussed Tdap vaccine yes, declined   Discussed COVID Vaccine no    Genetic/MFM-  Do you or your partner have a history of any of the following in yourselves or first degree relatives?  Cystic fibrosis no  Spinal muscular atrophy no  Hemoglobinopathy/Sickle Cell/Thalassemia no  Fragile X Intellectual Disability no    If yes, discuss Carrier Screening and recommend consultation with MFM/Genetic Counseling and place specific [facility] Referral for.    If no, discuss Carrier Screening being completed once in a lifetime as a standard of care lab test. Place orders for Cystic Fibrosis Gene Test ([id number]) and Spinal Muscular Atrophy DNA ([id number])      Appointment for Nuchal Translucency Ultrasound at [facility] scheduled for       Interview education  [facility] Pregnancy Essentials Book reviewed, discussed and attached to their AVS yes    Nurse/Family Partnership- patient may qualify no; referral placed no    Prenatal lab work scripts yes  Extra labs ordered:  1 hr gtt  TSH  PreE    Aspirin/Preeclampsia Screen    Risk Level Risk Factor Recommendation   LOW Prior Uncomplicated full-term delivery YES No Aspirin recommendation        MODERATE Nulliparity no Recommend low-dose aspirin if     BMI>30 no 2 or more moderate risk factors    Family History Preeclampsia (mother/sister) no     35yr old or greater YES     Black Race, Concern for SDOH/Low  Socioeconomic no     IVF Pregnancy  no     Personal History Risks (low birth weight, prior adverse preg outcome, >10yr preg interval) no         HIGH History of Preeclampsia YES Recommend low-dose aspirin if     Multifetal gestation no 1 or more high risk factors    Chronic HTN no     Type 1 or 2 Diabetes no     Renal Disease no     Autoimmune Disease  no      Contraindications to ASA therapy:  NSAID/ ASA allergy: no  Nasal polyps: no  Asthma with history of ASA induced bronchospasm: no  Relative contraindications:  History of GI bleed: no  Active peptic ulcer disease: no  Severe hepatic dysfunction: no    Patient should be recommended to take ASA 162mg during this pregnancy from 12-36wks to lower her risk of preeclampsia: High Risk Criteria- aware to start ASA therapy at 12 weeks.           The patient has a history now or in prior pregnancy notable for:  pre-clampsia and hx of LGA 9lbs, hx of HSV, hx of hypothyroidism, hx of colposcopy x 2 ([facility])      Details that I feel the provider should be aware of: This is a welcomed pregnancy for Suyapa and her significant other Eliezer.  [place] [facility] [place] [facility] This is their second child. Their son Kamar was born via - he was LGA and she was diagnosed with preE w/o SF on admission.  She is overall feeling well so far, some mild nausea and fatigue. Patient is aware of PN1 labs and to have them completed before her next appointment. Carrier screening discussed patient declined. Blue folder was not given and reviewed today as this was a telephone encounter.     PN1 visit scheduled. The patient was oriented to our practice, the navigator role, reviewed delivering physicians and [facility] for Delivery. All questions were answered.    Interviewed by: Christiana Rodriguez RN

## 2025-06-27 ENCOUNTER — TELEPHONE (OUTPATIENT)
Age: 35
End: 2025-06-27

## 2025-06-27 NOTE — TELEPHONE ENCOUNTER
10w3d OB patient asking where she should obtain blood work. Informed patient would depend on insurance coverage, preferred lab if covered by insurance would be [facility]. Reviewed different [facility] lab locations with patient. Patient verbalized understanding.

## 2025-06-30 ENCOUNTER — APPOINTMENT (OUTPATIENT)
Dept: LAB | Facility: CLINIC | Age: 35
End: 2025-06-30
Payer: COMMERCIAL

## 2025-06-30 DIAGNOSIS — Z34.81 PRENATAL CARE, SUBSEQUENT PREGNANCY, FIRST TRIMESTER: ICD-10-CM

## 2025-06-30 LAB
ABO GROUP BLD: NORMAL
ALBUMIN SERPL BCG-MCNC: 4.6 G/DL (ref 3.5–5)
ALP SERPL-CCNC: 37 U/L (ref 34–104)
ALT SERPL W P-5'-P-CCNC: 8 U/L (ref 7–52)
ANION GAP SERPL CALCULATED.3IONS-SCNC: 6 MMOL/L (ref 4–13)
AST SERPL W P-5'-P-CCNC: 16 U/L (ref 13–39)
BASOPHILS # BLD AUTO: 0.03 THOUSANDS/ÂΜL (ref 0–0.1)
BASOPHILS NFR BLD AUTO: 0 % (ref 0–1)
BILIRUB SERPL-MCNC: 0.69 MG/DL (ref 0.2–1)
BILIRUB UR QL STRIP: NEGATIVE
BLD GP AB SCN SERPL QL: NEGATIVE
BUN SERPL-MCNC: 8 MG/DL (ref 5–25)
CALCIUM SERPL-MCNC: 9.2 MG/DL (ref 8.4–10.2)
CHLORIDE SERPL-SCNC: 103 MMOL/L (ref 96–108)
CLARITY UR: NORMAL
CO2 SERPL-SCNC: 27 MMOL/L (ref 21–32)
COLOR UR: NORMAL
CREAT SERPL-MCNC: 0.49 MG/DL (ref 0.6–1.3)
CREAT UR-MCNC: 55.2 MG/DL
EOSINOPHIL # BLD AUTO: 0.04 THOUSAND/ÂΜL (ref 0–0.61)
EOSINOPHIL NFR BLD AUTO: 1 % (ref 0–6)
ERYTHROCYTE [DISTWIDTH] IN BLOOD BY AUTOMATED COUNT: 12.8 % (ref 11.6–15.1)
GFR SERPL CREATININE-BSD FRML MDRD: 126 ML/MIN/1.73SQ M
GLUCOSE 1H P 50 G GLC PO SERPL-MCNC: 85 MG/DL (ref 70–134)
GLUCOSE SERPL-MCNC: 83 MG/DL (ref 65–140)
GLUCOSE UR STRIP-MCNC: NEGATIVE MG/DL
HCT VFR BLD AUTO: 41 % (ref 34.8–46.1)
HGB BLD-MCNC: 14.2 G/DL (ref 11.5–15.4)
HGB UR QL STRIP.AUTO: NEGATIVE
IMM GRANULOCYTES # BLD AUTO: 0.03 THOUSAND/UL (ref 0–0.2)
IMM GRANULOCYTES NFR BLD AUTO: 0 % (ref 0–2)
KETONES UR STRIP-MCNC: NEGATIVE MG/DL
LEUKOCYTE ESTERASE UR QL STRIP: NEGATIVE
LYMPHOCYTES # BLD AUTO: 1.69 THOUSANDS/ÂΜL (ref 0.6–4.47)
LYMPHOCYTES NFR BLD AUTO: 25 % (ref 14–44)
MCH RBC QN AUTO: 30.3 PG (ref 26.8–34.3)
MCHC RBC AUTO-ENTMCNC: 34.6 G/DL (ref 31.4–37.4)
MCV RBC AUTO: 88 FL (ref 82–98)
MONOCYTES # BLD AUTO: 0.3 THOUSAND/ÂΜL (ref 0.17–1.22)
MONOCYTES NFR BLD AUTO: 5 % (ref 4–12)
NEUTROPHILS # BLD AUTO: 4.61 THOUSANDS/ÂΜL (ref 1.85–7.62)
NEUTS SEG NFR BLD AUTO: 69 % (ref 43–75)
NITRITE UR QL STRIP: NEGATIVE
NRBC BLD AUTO-RTO: 0 /100 WBCS
PH UR STRIP.AUTO: 7.5 [PH]
PLATELET # BLD AUTO: 290 THOUSANDS/UL (ref 149–390)
PMV BLD AUTO: 9 FL (ref 8.9–12.7)
POTASSIUM SERPL-SCNC: 3.5 MMOL/L (ref 3.5–5.3)
PROT SERPL-MCNC: 7.5 G/DL (ref 6.4–8.4)
PROT UR STRIP-MCNC: NEGATIVE MG/DL
PROT UR-MCNC: 4.3 MG/DL
PROT/CREAT UR: 0.1 MG/G{CREAT}
RBC # BLD AUTO: 4.68 MILLION/UL (ref 3.81–5.12)
RH BLD: POSITIVE
RUBV IGG SERPL IA-ACNC: 27.4 IU/ML
SODIUM SERPL-SCNC: 136 MMOL/L (ref 135–147)
SP GR UR STRIP.AUTO: 1.01 (ref 1–1.03)
SPECIMEN EXPIRATION DATE: NORMAL
TSH SERPL DL<=0.05 MIU/L-ACNC: 1.98 UIU/ML (ref 0.45–4.5)
URATE SERPL-MCNC: 3.2 MG/DL (ref 2–7.5)
UROBILINOGEN UR STRIP-ACNC: <2 MG/DL
WBC # BLD AUTO: 6.7 THOUSAND/UL (ref 4.31–10.16)

## 2025-06-30 PROCEDURE — 81003 URINALYSIS AUTO W/O SCOPE: CPT

## 2025-06-30 PROCEDURE — 86706 HEP B SURFACE ANTIBODY: CPT

## 2025-06-30 PROCEDURE — 86780 TREPONEMA PALLIDUM: CPT

## 2025-06-30 PROCEDURE — 85025 COMPLETE CBC W/AUTO DIFF WBC: CPT

## 2025-06-30 PROCEDURE — 86803 HEPATITIS C AB TEST: CPT

## 2025-06-30 PROCEDURE — 87340 HEPATITIS B SURFACE AG IA: CPT

## 2025-06-30 PROCEDURE — 84443 ASSAY THYROID STIM HORMONE: CPT

## 2025-06-30 PROCEDURE — 82570 ASSAY OF URINE CREATININE: CPT

## 2025-06-30 PROCEDURE — 87086 URINE CULTURE/COLONY COUNT: CPT

## 2025-06-30 PROCEDURE — 87389 HIV-1 AG W/HIV-1&-2 AB AG IA: CPT

## 2025-06-30 PROCEDURE — 80053 COMPREHEN METABOLIC PANEL: CPT

## 2025-06-30 PROCEDURE — 86762 RUBELLA ANTIBODY: CPT

## 2025-06-30 PROCEDURE — 84550 ASSAY OF BLOOD/URIC ACID: CPT

## 2025-06-30 PROCEDURE — 86900 BLOOD TYPING SEROLOGIC ABO: CPT

## 2025-06-30 PROCEDURE — 36415 COLL VENOUS BLD VENIPUNCTURE: CPT

## 2025-06-30 PROCEDURE — 82950 GLUCOSE TEST: CPT

## 2025-06-30 PROCEDURE — 84156 ASSAY OF PROTEIN URINE: CPT

## 2025-06-30 PROCEDURE — 86901 BLOOD TYPING SEROLOGIC RH(D): CPT

## 2025-06-30 PROCEDURE — 86850 RBC ANTIBODY SCREEN: CPT

## 2025-07-01 LAB
BACTERIA UR CULT: NORMAL
HBV SURFACE AB SER-ACNC: 7.21 MIU/ML
HBV SURFACE AG SER QL: NORMAL
HCV AB SER QL: NORMAL
HIV 1+2 AB+HIV1 P24 AG SERPL QL IA: NORMAL
TREPONEMA PALLIDUM IGG+IGM AB [PRESENCE] IN SERUM OR PLASMA BY IMMUNOASSAY: NORMAL

## 2025-07-07 PROBLEM — O09.529 AMA (ADVANCED MATERNAL AGE) MULTIGRAVIDA 35+: Status: ACTIVE | Noted: 2025-07-07

## 2025-07-07 PROBLEM — Z3A.12 12 WEEKS GESTATION OF PREGNANCY: Status: ACTIVE | Noted: 2025-07-07

## 2025-07-07 PROBLEM — E03.9 HYPOTHYROID IN PREGNANCY, ANTEPARTUM: Status: ACTIVE | Noted: 2025-07-07

## 2025-07-07 PROBLEM — O99.280 HYPOTHYROID IN PREGNANCY, ANTEPARTUM: Status: ACTIVE | Noted: 2025-07-07

## 2025-07-07 PROBLEM — O09.299 PRIOR FETAL MACROSOMIA, ANTEPARTUM: Status: ACTIVE | Noted: 2025-07-07

## 2025-07-07 PROBLEM — O09.299 HISTORY OF PRE-ECLAMPSIA IN PRIOR PREGNANCY, CURRENTLY PREGNANT: Status: ACTIVE | Noted: 2024-08-21

## 2025-07-08 ENCOUNTER — INITIAL PRENATAL (OUTPATIENT)
Dept: OBGYN CLINIC | Facility: CLINIC | Age: 35
End: 2025-07-08

## 2025-07-08 VITALS
OXYGEN SATURATION: 98 % | BODY MASS INDEX: 25.2 KG/M2 | DIASTOLIC BLOOD PRESSURE: 78 MMHG | SYSTOLIC BLOOD PRESSURE: 108 MMHG | WEIGHT: 180 LBS | HEIGHT: 71 IN | HEART RATE: 86 BPM

## 2025-07-08 DIAGNOSIS — O09.529 ANTEPARTUM MULTIGRAVIDA OF ADVANCED MATERNAL AGE: ICD-10-CM

## 2025-07-08 DIAGNOSIS — Z11.3 SCREENING FOR STD (SEXUALLY TRANSMITTED DISEASE): ICD-10-CM

## 2025-07-08 DIAGNOSIS — Z3A.12 12 WEEKS GESTATION OF PREGNANCY: Primary | ICD-10-CM

## 2025-07-08 DIAGNOSIS — O09.299 PRIOR FETAL MACROSOMIA, ANTEPARTUM: ICD-10-CM

## 2025-07-08 DIAGNOSIS — Z86.19 HISTORY OF HERPES LABIALIS: ICD-10-CM

## 2025-07-08 DIAGNOSIS — O09.899 SHORT INTERVAL BETWEEN PREGNANCIES AFFECTING PREGNANCY, ANTEPARTUM: ICD-10-CM

## 2025-07-08 DIAGNOSIS — O99.280 HYPOTHYROID IN PREGNANCY, ANTEPARTUM: ICD-10-CM

## 2025-07-08 DIAGNOSIS — O09.299 HISTORY OF PRE-ECLAMPSIA IN PRIOR PREGNANCY, CURRENTLY PREGNANT: ICD-10-CM

## 2025-07-08 DIAGNOSIS — E03.9 HYPOTHYROID IN PREGNANCY, ANTEPARTUM: ICD-10-CM

## 2025-07-08 PROCEDURE — 87491 CHLMYD TRACH DNA AMP PROBE: CPT

## 2025-07-08 PROCEDURE — PNV

## 2025-07-08 PROCEDURE — 87591 N.GONORRHOEAE DNA AMP PROB: CPT

## 2025-07-08 RX ORDER — ASPIRIN 81 MG/1
162 TABLET, CHEWABLE ORAL DAILY
Qty: 90 TABLET | Refills: 3 | Status: SHIPPED | OUTPATIENT
Start: 2025-07-08

## 2025-07-08 NOTE — ASSESSMENT & PLAN NOTE
-She is currently on Levothyroxine 75 mcg  -Normal first trimester TSH, will recheck in 2nd trimester  Orders:    aspirin 81 mg chewable tablet; Chew 2 tablets (162 mg total) daily Starting at 12 weeks gestation until 36 weeks gestation

## 2025-07-08 NOTE — ASSESSMENT & PLAN NOTE
Patient is a 35 y.o.  at 12w0d presenting for initial prenatal visit.  -Patient is doing well today denies NV, VB, cramping  -Gonorrhea and Chlamydia cultures obtained today  -Pap smear UTD  -Prenatal labs reviewed -- WNL  -Continue PNV  -Plans to breastfeed, she is currently breastfeeding   -AFP reviewed   -Reviewed the reasons to call - namely vaginal bleeding, severe nausea and vomiting, severe pelvic pain, fevers or pain/burning with urination.    -Discussed as well during this visit was diet, prenatal vitamins, prenatal visits, lab testing, breast feeding, vaccinations, maternal fetal medicine consultations, travel precautions to include seat belt use and VTE risk reduction, and lifestyle.    -RTO in 4 weeks for routine PN visit    Blue packet given and reviewed

## 2025-07-08 NOTE — ASSESSMENT & PLAN NOTE
-Patient's BP is 108/78 today   -Currently asymptomatic  -Continue on ASA until 36 weeks   -Reviewed preeclampsia precautions such as headaches, swelling, vision changes, abdominal pain, or sudden weight gain of 5 pounds in a few days.

## 2025-07-08 NOTE — PROGRESS NOTES
Name: Suyapa Mann      : 1990      MRN: [id number]  Encounter Provider: Yesenia Bojorquez PA-C  Encounter Date: 2025   Encounter department: [facility] [place]  :  Assessment & Plan  12 weeks gestation of pregnancy  Patient is a 35 y.o.  at 12w0d presenting for initial prenatal visit.  -Patient is doing well today denies NV, VB, cramping  -Gonorrhea and Chlamydia cultures obtained today  -Pap smear UTD  -Prenatal labs reviewed -- WNL  -Continue PNV  -Plans to breastfeed, she is currently breastfeeding   -AFP reviewed   -Reviewed the reasons to call - namely vaginal bleeding, severe nausea and vomiting, severe pelvic pain, fevers or pain/burning with urination.    -Discussed as well during this visit was diet, prenatal vitamins, prenatal visits, lab testing, breast feeding, vaccinations, maternal fetal medicine consultations, travel precautions to include seat belt use and VTE risk reduction, and lifestyle.    -RTO in 4 weeks for routine PN visit    Blue packet given and reviewed        Antepartum multigravida of advanced maternal age  -Level 1 US scheduled        History of pre-eclampsia in prior pregnancy, currently pregnant  -Patient's BP is 108/78 today   -Currently asymptomatic  -Continue on ASA until 36 weeks   -Reviewed preeclampsia precautions such as headaches, swelling, vision changes, abdominal pain, or sudden weight gain of 5 pounds in a few days.        Hypothyroid in pregnancy, antepartum  -She is currently on Levothyroxine 75 mcg  -Normal first trimester TSH, will recheck in 2nd trimester  Orders:    aspirin 81 mg chewable tablet; Chew 2 tablets (162 mg total) daily Starting at 12 weeks gestation until 36 weeks gestation    Prior fetal macrosomia, antepartum  -Passed early glucola, will rescreen patient at 28 weeks        Short interval between pregnancies affecting pregnancy, antepartum  -Level 1 US scheduled        History of  herpes labialis  -Last outbreak was 10 years ago  -Valtrex at 36 weeks        Screening for STD (sexually transmitted disease)    Orders:    Chlamydia/GC amplified DNA by PCR    _____________________________________________________    History of Present Illness   HPI  Suyapa Mann is a 35 y.o.  at 11w6d with Estimated Date of Delivery: 26 by Patient's last menstrual period was 04/10/2025 (approximate). consistent w/ 1st trimester US.     She denies vomiting and bleeding/cramping. Reports nausea.    Patient reports that she does have cats in the home.      OB history:   at 40w5d in      GYN history: Last pap smear 2024 NILM/neg. Reports history of STDs. genital HSV at 19. She has not had an outbreak in 10 years.     Past medical history: Past Medical History[1]    Past surgical history: Past Surgical History[2]    Social history: Denies tobacco, alcohol, or illicit drug use.  Social History     Socioeconomic History    Marital status: /Civil Union     Spouse name: Not on file    Number of children: Not on file    Years of education: Not on file    Highest education level: Not on file   Occupational History    Not on file   Tobacco Use    Smoking status: Former     Current packs/day: 0.00     Types: Cigarettes     Quit date: 10/2019     Years since quittin.7    Smokeless tobacco: Never    Tobacco comments:     had smoked about 1/2 ppd since age 17-18   Vaping Use    Vaping status: Former    Quit date: 2023    Substances: Nicotine, Flavoring   Substance and Sexual Activity    Alcohol use: Not Currently     Comment: occasional    Drug use: Not Currently     Types: Marijuana     Comment: last used     Sexual activity: Yes     Partners: Male     Birth control/protection: None   Other Topics Concern    Not on file   Social History Narrative    Unmarried, no children    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 [employer]     Social Drivers of Health  "    Financial Resource Strain: Not on file   Food Insecurity: No Food Insecurity (2025)    Nursing - Inadequate Food Risk Classification     Worried About Running Out of Food in the Last Year: Never true     Ran Out of Food in the Last Year: Never true     Ran Out of Food in the Last Year: Not on file   Transportation Needs: No Transportation Needs (2025)    PRAPARE - Transportation     Lack of Transportation (Medical): No     Lack of Transportation (Non-Medical): No   Physical Activity: Not on file   Stress: Not on file   Social Connections: Unknown (2024)    Received from [facility]     How often do you feel lonely or isolated from those around you? (Adult - for ages 18 years and over): Not on file   Intimate Partner Violence: Not on file   Housing Stability: Low Risk  (2025)    Housing Stability Vital Sign     Unable to Pay for Housing in the Last Year: No     Number of Times Moved in the Last Year: 0     Homeless in the Last Year: No         Review of Systems   Constitutional: Negative.    Cardiovascular:  Negative for leg swelling.   Gastrointestinal:  Positive for nausea. Negative for abdominal pain and vomiting.   Genitourinary:  Negative for dysuria, pelvic pain, vaginal bleeding and vaginal discharge.   Neurological:  Negative for headaches.   Psychiatric/Behavioral:  Negative for dysphoric mood. The patient is not nervous/anxious.           Objective   /78   Pulse 86   Ht 5' 11\" (1.803 m)   Wt 81.6 kg (180 lb)   LMP 04/10/2025 (Approximate)   SpO2 98%   BMI 25.10 kg/m²        Pre- Vitals      Flowsheet Row Most Recent Value   Prenatal Assessment    Fetal Heart Rate 160   Prenatal Vitals    Blood Pressure 108/78   Weight - Scale 81.6 kg (180 lb)   Urine Albumin/Glucose    Dilation/Effacement/Station    Vaginal Drainage    Edema              Physical Exam  Constitutional:       General: She is not in acute distress.     Appearance: Normal appearance. " She is well-developed and well-groomed. She is not ill-appearing.   Genitourinary:      Bladder, rectum and urethral meatus normal.      No lesions in the vagina.      Right Labia: No rash, tenderness, lesions or skin changes.     Left Labia: No tenderness, lesions, skin changes or rash.     No vaginal discharge, erythema, tenderness or bleeding.      No vaginal prolapse present.     No vaginal atrophy present.       Right Adnexa: not tender, not full and no mass present.     Left Adnexa: not tender, not full and no mass present.     No cervical motion tenderness, discharge, friability, lesion or polyp.      Uterus is not enlarged, fixed, tender or irregular.      No uterine mass detected.     No urethral tenderness or mass present.      Bladder is not tender.    Breasts:     Breasts are symmetrical.      Right: Normal. Present. No swelling, bleeding, inverted nipple, mass, nipple discharge, skin change, tenderness or breast implant.      Left: Normal. Present. No swelling, bleeding, inverted nipple, mass, nipple discharge, skin change, tenderness or breast implant.   HENT:      Head: Normocephalic and atraumatic.   Neck:      Thyroid: No thyroid mass, thyromegaly or thyroid tenderness.   Pulmonary:      Effort: Pulmonary effort is normal.   Abdominal:      General: Abdomen is flat.   Lymphadenopathy:      Upper Body:      Right upper body: No supraclavicular or axillary adenopathy.      Left upper body: No supraclavicular or axillary adenopathy.     Neurological:      Mental Status: She is alert.     Psychiatric:         Mood and Affect: Mood normal.         Behavior: Behavior normal. Behavior is cooperative.         Thought Content: Thought content normal.         Judgment: Judgment normal.                 [1]   Past Medical History:  Diagnosis Date    Abnormal Pap smear of cervix 2020    LGSIL colpo    Herpes     Hypothyroidism     Pre-eclampsia 2024    w/o SF in labor    Varicella    [2]   Past Surgical  History:  Procedure Laterality Date    COLPOSCOPY  2020    WISDOM TOOTH EXTRACTION        1 or 2

## 2025-07-09 ENCOUNTER — TELEPHONE (OUTPATIENT)
Age: 35
End: 2025-07-09

## 2025-07-09 NOTE — PATIENT INSTRUCTIONS
You elected to have non-invasive prenatal screening (NIPS). This involves a blood test to check for the four most common genetic syndromes (Trisomy 21, Trisomy 13, Trisomy 18, and sex chromosome abnormalities). It also MAY report the biologic sex of the fetus if you opted to learn this information. You can call our office to verbally review results to avoid inadvertently learning this information via [employer], if desired. Results will be visible in your [employer] portal 5-7 business days from when the test is drawn. Please follow all instructions regarding insurance cost/coverage provided to you today. Please contact our office with any concerns or questions. You will need spina bifida screening (called MSAFP) for the baby beginning at 15 weeks gestation, which will be ordered by your obstetrician's office. This test allows for earlier detection of spina bifida than is possible by ultrasound, and is advised in all pregnancies.        Thank you for choosing us for your  care today.  If you have any questions about your ultrasound or care, please do not hesitate to contact us or your primary obstetrician.        Some general instructions for your pregnancy are:    Exercise: Aim for 150 minutes per week of regular exercise.  Walking is great!  Nutrition: Choose healthy sources of calcium, iron, and protein.  Avoid ultraprocessed foods and added sugar.  Learn about Preeclampsia: preeclampsia is a common, potentially serious high blood pressure complication in pregnancy.  A blood pressure of 140mmHg (systolic or top number) or 90mmHg (diastolic or bottom number) should be evaluated by your doctor.  Aspirin is sometimes prescribed in early pregnancy to prevent preeclampsia in women with risk factors - ask your obstetrician if you should be on this medication.  For more resources, visit:  [email or web address].[email or web address]  If you smoke, please try to quit completely but also try to reduce your  smoking by as much as possible (as soon as possible).  Do not vape.  Please also avoid cannabis products.  Other warning signs to watch out for in pregnancy or postpartum: chest pain, obstructed breathing or shortness of breath, seizures, thoughts of hurting yourself or your baby, bleeding, a painful or swollen leg, fever, or headache (see [facility] POST-BIRTH Warning Signs campaign).  If these happen call 911.  Itching is also not normal in pregnancy and if you experience this, especially over your hands and feet, potentially worse at night, notify your doctors.

## 2025-07-09 NOTE — TELEPHONE ENCOUNTER
Patient is 12w1d calling to request note in support of her cancelling her [employer] subscription. She attempted to cancels as she is pregnant and doesn't want to continue getting massages and paying for the subscription but they need documentation that it is not recommended for her.     Message to Yesenia LEUNG and OB Navigator

## 2025-07-10 LAB
C TRACH DNA SPEC QL NAA+PROBE: NEGATIVE
N GONORRHOEA DNA SPEC QL NAA+PROBE: NEGATIVE

## 2025-07-11 ENCOUNTER — ANCILLARY PROCEDURE (OUTPATIENT)
Dept: PERINATAL CARE | Facility: CLINIC | Age: 35
End: 2025-07-11
Attending: PHYSICIAN ASSISTANT
Payer: COMMERCIAL

## 2025-07-11 VITALS
SYSTOLIC BLOOD PRESSURE: 116 MMHG | OXYGEN SATURATION: 99 % | HEIGHT: 71 IN | DIASTOLIC BLOOD PRESSURE: 64 MMHG | HEART RATE: 88 BPM | BODY MASS INDEX: 25.2 KG/M2 | WEIGHT: 180 LBS

## 2025-07-11 DIAGNOSIS — Z3A.12 12 WEEKS GESTATION OF PREGNANCY: ICD-10-CM

## 2025-07-11 DIAGNOSIS — Z36.0 ENCOUNTER FOR ANTENATAL SCREENING FOR CHROMOSOMAL ANOMALIES: ICD-10-CM

## 2025-07-11 DIAGNOSIS — O09.521 SUPERVISION OF ELDERLY MULTIGRAVIDA IN FIRST TRIMESTER: Primary | ICD-10-CM

## 2025-07-11 DIAGNOSIS — E03.9 HYPOTHYROIDISM AFFECTING PREGNANCY IN FIRST TRIMESTER: ICD-10-CM

## 2025-07-11 DIAGNOSIS — Z82.79 FAMILY HISTORY OF CHROMOSOMAL ABNORMALITY: ICD-10-CM

## 2025-07-11 DIAGNOSIS — O99.281 HYPOTHYROIDISM AFFECTING PREGNANCY IN FIRST TRIMESTER: ICD-10-CM

## 2025-07-11 DIAGNOSIS — Z3A.13 13 WEEKS GESTATION OF PREGNANCY: ICD-10-CM

## 2025-07-11 PROCEDURE — 76813 OB US NUCHAL MEAS 1 GEST: CPT | Performed by: OBSTETRICS & GYNECOLOGY

## 2025-07-11 PROCEDURE — 99214 OFFICE O/P EST MOD 30 MIN: CPT | Performed by: OBSTETRICS & GYNECOLOGY

## 2025-07-11 PROCEDURE — 76801 OB US < 14 WKS SINGLE FETUS: CPT | Performed by: OBSTETRICS & GYNECOLOGY

## 2025-07-11 PROCEDURE — NC001 PR NO CHARGE: Performed by: OBSTETRICS & GYNECOLOGY

## 2025-07-11 PROCEDURE — 36415 COLL VENOUS BLD VENIPUNCTURE: CPT | Performed by: OBSTETRICS & GYNECOLOGY

## 2025-07-11 NOTE — LETTER
2025     Stephanie Hopper PA-C  [address]  [address]  [place] [address]    Patient: Suyapa Mann   YOB: 1990   Date of Visit: 2025       Dear Ms. Stephanie Hopper PA-C:    Thank you for referring Suyapa Mann to me for evaluation. Below are my notes for this consultation.    If you have questions, please do not hesitate to call me. I look forward to following your patient along with you.         Sincerely,        Prashanth Deras MD        CC: No Recipients    Prashanth Deras MD  2025  6:33 PM  Sign when Signing Visit  CONSULTATION: MATERNAL-FETAL MEDICINE  Name: Suyapa Mann      : 1990      MRN: [id number]  Encounter Provider: Prashanth Deras MD  Encounter Date: 2025   [facility]  :  Assessment & Plan  13 weeks gestation of pregnancy         Hypothyroidism affecting pregnancy in first trimester  Hypothyroidism is characterized by inadequate thyroid hormone production, and usually requires elevated TSH, and a low free T4 (or free T3). Total T3 and Total T4 levels will normally be elevated in pregnancy due to elevated thyroxin-binding globulin levels. Thus these labs should never be ordered in pregnancy as they will be falsely elevated.    Sub-clinical hypothyroidism requires elevated TSH but normal free T4. A large clinical trial in the  demonstrated that treatment of subclinical hypothyroidism or hypothyroxinemia  between 8-20 weeks of gestation did not result in significantly better cognitive outcomers in children through 5 years of age compated to no treatment. (NEJ 2017; 376:815-825)    Hashimoto's thyroiditis is the most common cause of hypothyroidism in pregnancy, and thyroid peroxidase antibodies ( also called antimicrosomal antibodies) are seen in >90% of these women. Women at high risk for hypothyroidism should be screened with TSH and free FT4.     Untreated or partially  treated hypothyroidism is associated with increased risk of pre-eclampsia, abruption,  birth, low birth weight, fetal death, and long-term impaired psychomotor function.      Most women who are hypothyroid will need an increase in their thyroxine replacement dose. TSH and free T4 levels should be checked preconceptionally, at her first prenatal visit in the first trimester, 4 weeks after altering the doses, (therefore every 4 weeks until TSH is normal, especially in first 20 weeks), and at least every trimester in pregnancy. Every woman with a thyroid nodule should have fine needle aspiration and TSH checked    Oral levothyroxine is indicated for women with overt hypothyroidism, which is associated with greater risks for fetal loss and premature birth, and for those with subclinical hypothyroidism who test positive for TPO antibodies. In the first trimester, normal range for TSH level is 0.1 to 2.5 mIU/L; this level increases to 0.2 to 3.0 mIU/L in the second trimester and 0.3 to 3.0 mIU/L in the third trimester.     Certain drugs, eg, cholestyramine, ferrous sulfate, sucralfate, and aluminum hydroxide antacids, may interfere with levothyroxine absorption from the gut. Levothyroxine administration should be spaced at least 4 hours apart from these medications. Other drugs, especially the anticonvulsants phenytoin and carbamazepine and the antituberculous agent rifampin, may accelerate levothyroxine metabolism, necessitating higher levothyroxine doses.  Orders:  •  Ambulatory Referral to Maternal Fetal Medicine    Supervision of elderly multigravida in first trimester  Advanced Maternal Age (AMA) is defined as maternal age 35 or greater at EDC. Advanced maternal age is associated with an increased risk of several pregnancy outcomes, including aneuploidy/genetic syndromes, poor fetal growth, stillbirth, maternal hypertensive disorders, and gestational diabetes. Despite these increased risks, many women of  advanced maternal age have normal, healthy pregnancy outcomes, particularly if they have no co-existing medical conditions. Genetic counseling is available to further discuss genetic screening and testing options available in pregnancy. Risk of adverse outcomes is proportional to patient age.     Orders:  •  [id number] PLUS Core+SCA    Encounter for  screening for chromosomal anomalies  Screening and diagnostic tests available for fetal aneuploidy were discussed. The Sequential Screen was discussed in detail, including the sensitivity for detection of Down syndrome estimated at 95%.      Cell-free DNA (cfDNA)  (Non invasive prenatal testing)-NIPT) screening has a 99% detection rate for Down syndrome, 96% for trisomy 18, and 91% for trisomy 13 with <1% false positive rate.Sex chromosome analysis (SCA) is an option for cell-free DNA (NIPT), to estimate risk of a sex chromosome aneuploidy. It may need to obtain approval from the insurance company.  Ms. Mann indicated wants to complete SCA analysis, fetal sex testing from NIPT.  Had a blood sample drawn in the office today    Ms Mann  declined use of definitive prenatal diagnosis, which is possible only through genetic amniocentesis or CVS.       Family history of chromosomal abnormality  History of second cousin in her side of the family and a first cousin and her partners side of the family of trisomy 21.           History of Present Illness    Suyapa is a 35 y.o.  at 13w1d presenting for consultation for aneuploidy screening, advanced maternal age, and hypothyroidism, family history of chromosomal abnormality. She reports no obstetric complaints today.    Her Antepartum course is significant for the following problems: Problem List[1] .    Past Medical History  OB History    Para Term  AB Living   2 1 1 0 0 1   SAB IAB Ectopic Multiple Live Births   0 0 0 0 1      # Outcome Date GA Lbr Erick/2nd Weight Sex Type Anes PTL Lv   2  "Current            1 Term 24 40w5d / 00:45 4100 g (9 lb 0.6 oz) M Vag-Spont Local  KRISTOFER      Complications: Pre-eclampsia     For this pregnancy use the last menstrual period of April 10, 2025 to establish a gestational age as compared to earlier ultrasounds the difference is not large enough to change the due date.  Selected a due date of January 15, 2026 and confirmed a gestational age of 13 weeks and 1 day today.    Past Medical History[2]  Past Surgical History[3]    Social History:   She denies current use of alcohol, drugs of abuse, tobacco, and marijuana products.   Occupation:   Partner: Eliezer, is 32 years old, a , healthy, present during the visit today    Family History:  Family history was reviewed using an office screening tool, and is negative for congenital anomalies, genetic diseases, and thromboembolism in first degree relatives of this pregnancy. Family history is notable for a second cousin on her side of the family and a first cousin on her partners Eliezer's side of the family trisomy 21.    Current Medications[4]  Allergies: No Known Allergies      Objective  /64 (BP Location: Right arm, Patient Position: Sitting, Cuff Size: Standard)   Pulse 88   Ht 5' 11\" (1.803 m)   Wt 81.6 kg (180 lb)   LMP 04/10/2025 (Approximate)   SpO2 99%   BMI 25.10 kg/m²      Patient's last menstrual period was 04/10/2025 (approximate).  Estimated Delivery Date: 1/15/2026, by LMP which is consistent with the gestational age estimated by ultrasound.    Physical Examalert, well appearing, and in no distress and oriented to person, place, and time  General appearance: .  The remainder of her physical examination was deferred as she was here today for consultation and discussion.    Please refer to \"Imaging\" for ultrasound report from today's visit.     Impression:    Ms. Mann is a 35-year-old patient  2 para 1 who presents at a gestational age of 13 weeks and 1 day for " ultrasound examination, please review under the epic imaging tab.  Gestational age of 13 weeks and 1 day determined by LMP with an LINDA of 1/15/2026, gestational age by LMP which is not different when compared  the gestational age established by ultrasound examination.    Plan and other recommendations:    1.  MSAFP to screen for open neural tube defects after 16 before 22 weeks gestation to be ordered and followed from your office.    2.  Early  glucose tolerance testing due to advanced maternal age and a history of a first baby born with a weight of 9 pounds 1 ounce, and if normal to be repeated at 28 weeks gestation..    3.  Use of aspirin 162 mg daily  (81 miligram baby aspirin two tablets)   up to 36 weeks gestation is recommended. New data suggests that a dose higher than 100 mg and started before 16 weeks gestation can reduce te risk of  preeclampsia ([facility]  2017  Eliane DORMAN et al).  This recommendation is made based on advanced maternal age as well as a history of elevation in blood pressure during labor which is unclear if it was related to hypertensive disease of pregnancy.    Thank you for referring Ms. Mann to our care maternal-fetal medicine, do not hesitate to contact us if we can be of further assistance.    Prashanth Deras MD, MSCE    Maternal-fetal medicine    Administrative Statements  I have spent a total time of 30 minutes in caring for this patient on the day of the visit/encounter including Diagnostic results, Prognosis, Risks and benefits of tx options, Instructions for management, Patient and family education, Importance of tx compliance, Risk factor reductions, Impressions, Counseling / Coordination of care, Documenting in the medical record, Reviewing/placing orders in the medical record (including tests, medications, and/or procedures), Obtaining or reviewing history  , and Communicating with other healthcare professionals .         [1]   Patient Active Problem  List  Diagnosis   • BMI 26.0-26.9,adult   • History of herpes labialis   • Granulation tissue of vagina   • Hypothyroidism   • History of pre-eclampsia in prior pregnancy, currently pregnant   • Breast engorgement   • 12 weeks gestation of pregnancy   • AMA (advanced maternal age) multigravida 35+   • Prior fetal macrosomia, antepartum   • Hypothyroid in pregnancy, antepartum   • Short interval between pregnancies affecting pregnancy, antepartum   [2]   Past Medical History:  Diagnosis Date   • Abnormal Pap smear of cervix 2020    LGSIL colpo   • Herpes    • Hypothyroidism    • Pre-eclampsia 2024    w/o SF in labor   • Varicella    [3]   Past Surgical History:  Procedure Laterality Date   • COLPOSCOPY  2020   • WISDOM TOOTH EXTRACTION     [4]   Current Outpatient Medications:   •  levothyroxine 75 mcg tablet, take 1 tablet by mouth every morning (Patient taking differently: Take 75 mcg by mouth daily Daily Monday through Friday- No medication Saturdays or Sundays), Disp: 100 tablet, Rfl: 0  •  Prenatal MV-Min-Fe Fum-FA-DHA (PRENATAL 1 PO), Take by mouth, Disp: , Rfl:   •  aspirin 81 mg chewable tablet, Chew 2 tablets (162 mg total) daily Starting at 12 weeks gestation until 36 weeks gestation (Patient not taking: Reported on 7/11/2025), Disp: 90 tablet, Rfl: 3

## 2025-07-11 NOTE — LETTER
2025     Stephanie Hopper PA-C  [address]  [address]  [place] [address]    Patient: Suyapa Mann   YOB: 1990   Date of Visit: 2025       Dear Ms. Stephanie Hopper PA-C:    Thank you for referring Suyapa Mann to me for evaluation. Below are my notes for this consultation.    If you have questions, please do not hesitate to call me. I look forward to following your patient along with you.         Sincerely,        Prashanth Deras MD        CC: No Recipients    Prashanth Deras MD  2025  6:32 PM  Sign when Signing Visit  CONSULTATION: MATERNAL-FETAL MEDICINE  Name: Suyapa Mann      : 1990      MRN: [id number]  Encounter Provider: Prashanth Deras MD  Encounter Date: 2025   [facility]  :  Assessment & Plan  13 weeks gestation of pregnancy         Hypothyroidism affecting pregnancy in first trimester  Hypothyroidism is characterized by inadequate thyroid hormone production, and usually requires elevated TSH, and a low free T4 (or free T3). Total T3 and Total T4 levels will normally be elevated in pregnancy due to elevated thyroxin-binding globulin levels. Thus these labs should never be ordered in pregnancy as they will be falsely elevated.    Sub-clinical hypothyroidism requires elevated TSH but normal free T4. A large clinical trial in the  demonstrated that treatment of subclinical hypothyroidism or hypothyroxinemia  between 8-20 weeks of gestation did not result in significantly better cognitive outcomers in children through 5 years of age compated to no treatment. (NEJ 2017; 376:815-825)    Hashimoto's thyroiditis is the most common cause of hypothyroidism in pregnancy, and thyroid peroxidase antibodies ( also called antimicrosomal antibodies) are seen in >90% of these women. Women at high risk for hypothyroidism should be screened with TSH and free FT4.     Untreated or partially  treated hypothyroidism is associated with increased risk of pre-eclampsia, abruption,  birth, low birth weight, fetal death, and long-term impaired psychomotor function.      Most women who are hypothyroid will need an increase in their thyroxine replacement dose. TSH and free T4 levels should be checked preconceptionally, at her first prenatal visit in the first trimester, 4 weeks after altering the doses, (therefore every 4 weeks until TSH is normal, especially in first 20 weeks), and at least every trimester in pregnancy. Every woman with a thyroid nodule should have fine needle aspiration and TSH checked    Oral levothyroxine is indicated for women with overt hypothyroidism, which is associated with greater risks for fetal loss and premature birth, and for those with subclinical hypothyroidism who test positive for TPO antibodies. In the first trimester, normal range for TSH level is 0.1 to 2.5 mIU/L; this level increases to 0.2 to 3.0 mIU/L in the second trimester and 0.3 to 3.0 mIU/L in the third trimester.     Certain drugs, eg, cholestyramine, ferrous sulfate, sucralfate, and aluminum hydroxide antacids, may interfere with levothyroxine absorption from the gut. Levothyroxine administration should be spaced at least 4 hours apart from these medications. Other drugs, especially the anticonvulsants phenytoin and carbamazepine and the antituberculous agent rifampin, may accelerate levothyroxine metabolism, necessitating higher levothyroxine doses.  Orders:    Ambulatory Referral to Maternal Fetal Medicine    Supervision of elderly multigravida in first trimester  Advanced Maternal Age (AMA) is defined as maternal age 35 or greater at EDC. Advanced maternal age is associated with an increased risk of several pregnancy outcomes, including aneuploidy/genetic syndromes, poor fetal growth, stillbirth, maternal hypertensive disorders, and gestational diabetes. Despite these increased risks, many women of  advanced maternal age have normal, healthy pregnancy outcomes, particularly if they have no co-existing medical conditions. Genetic counseling is available to further discuss genetic screening and testing options available in pregnancy. Risk of adverse outcomes is proportional to patient age.     Orders:    [id number] PLUS Core+SCA    Encounter for  screening for chromosomal anomalies  Screening and diagnostic tests available for fetal aneuploidy were discussed. The Sequential Screen was discussed in detail, including the sensitivity for detection of Down syndrome estimated at 95%.      Cell-free DNA (cfDNA)  (Non invasive prenatal testing)-NIPT) screening has a 99% detection rate for Down syndrome, 96% for trisomy 18, and 91% for trisomy 13 with <1% false positive rate.Sex chromosome analysis (SCA) is an option for cell-free DNA (NIPT), to estimate risk of a sex chromosome aneuploidy. It may need to obtain approval from the insurance company.  Ms. Mann indicated wants to complete SCA analysis, fetal sex testing from NIPT.  Had a blood sample drawn in the office today    Ms Mann  declined use of definitive prenatal diagnosis, which is possible only through genetic amniocentesis or CVS.       Family history of chromosomal abnormality  History of second cousin in her side of the family and a first cousin and her partners side of the family of trisomy 21.           History of Present Illness{?Quick Links Encounters * My Last Note * Last Note in Specialty * Snapshot * Since Last Visit * History :92953}    Suyapa is a 35 y.o.  at 13w1d presenting for consultation for aneuploidy screening, advanced maternal age, and hypothyroidism, family history of chromosomal abnormality. She reports no obstetric complaints today.    Her Antepartum course is significant for the following problems: Problem List[1] .    Past Medical History  OB History    Para Term  AB Living   2 1 1 0 0 1   SAB IAB  "Ectopic Multiple Live Births   0 0 0 0 1      # Outcome Date GA Lbr Erick/2nd Weight Sex Type Anes PTL Lv   2 Current            1 Term 04/17/24 40w5d / 00:45 4100 g (9 lb 0.6 oz) M Vag-Spont Local  KRISTOFER      Complications: Pre-eclampsia     For this pregnancy use the last menstrual period of April 10, 2025 to establish a gestational age as compared to earlier ultrasounds the difference is not large enough to change the due date.  Selected a due date of January 15, 2026 and confirmed a gestational age of 13 weeks and 1 day today.    Past Medical History[2]  Past Surgical History[3]    Social History:   She denies current use of alcohol, drugs of abuse, tobacco, and marijuana products.   Occupation:   Partner: Eliezer, is 32 years old, a , healthy, present during the visit today    Family History:  Family history was reviewed using an office screening tool, and is negative for congenital anomalies, genetic diseases, and thromboembolism in first degree relatives of this pregnancy. Family history is notable for a second cousin on her side of the family and a first cousin on her partners Eliezer's side of the family trisomy 21.    Current Medications[4]  Allergies: No Known Allergies      Objective{?Quick Links Trend Vitals * Enter New Vitals * Results Review * Timeline (Adult) * Labs * Imaging * Cardiology * Procedures * Lung Cancer Screening * Surgical eConsent :32483}  /64 (BP Location: Right arm, Patient Position: Sitting, Cuff Size: Standard)   Pulse 88   Ht 5' 11\" (1.803 m)   Wt 81.6 kg (180 lb)   LMP 04/10/2025 (Approximate)   SpO2 99%   BMI 25.10 kg/m²      Patient's last menstrual period was 04/10/2025 (approximate).  Estimated Delivery Date: 1/15/2026, by LMP which is consistent with the gestational age estimated by ultrasound.    Physical Examalert, well appearing, and in no distress and oriented to person, place, and time  General appearance: .  The remainder of her physical " "examination was deferred as she was here today for consultation and discussion.    Please refer to \"Imaging\" for ultrasound report from today's visit.     Impression:    Ms. Mann is a 35-year-old patient  2 para 1 who presents at a gestational age of 13 weeks and 1 day for ultrasound examination, please review under the epic imaging tab.  Gestational age of 13 weeks and 1 day determined by LMP with an LINDA of 1/15/2026, gestational age by LMP which is not different when compared  the gestational age established by ultrasound examination.    Plan and other recommendations:    1.  MSAFP to screen for open neural tube defects after 16 before 22 weeks gestation to be ordered and followed from your office.    2.  Early  glucose tolerance testing due to advanced maternal age and a history of a first baby born with a weight of 9 pounds 1 ounce, and if normal to be repeated at 28 weeks gestation..    3.  Use of aspirin 162 mg daily  (81 miligram baby aspirin two tablets)   up to 36 weeks gestation is recommended. New data suggests that a dose higher than 100 mg and started before 16 weeks gestation can reduce te risk of  preeclampsia ([facility]  2017  Eliane DORMAN et al).  This recommendation is made based on advanced maternal age as well as a history of elevation in blood pressure during labor which is unclear if it was related to hypertensive disease of pregnancy.    Thank you for referring Ms. Mann to our care maternal-fetal medicine, do not hesitate to contact us if we can be of further assistance.    Prashanth Deras MD, MSCE    Maternal-fetal medicine    Administrative Statements{?Quick Links Full Problem List * Level of Service * [facility]}  I have spent a total time of *** minutes in caring for this patient on the day of the visit/encounter including Diagnostic results, Prognosis, Risks and benefits of tx options, Instructions for management, Patient and family education, Importance of tx " compliance, Risk factor reductions, Impressions, Counseling / Coordination of care, Documenting in the medical record, Reviewing/placing orders in the medical record (including tests, medications, and/or procedures), Obtaining or reviewing history  , and Communicating with other healthcare professionals .       [1]   Patient Active Problem List  Diagnosis    BMI 26.0-26.9,adult    History of herpes labialis    Granulation tissue of vagina    Hypothyroidism    History of pre-eclampsia in prior pregnancy, currently pregnant    Breast engorgement    12 weeks gestation of pregnancy    AMA (advanced maternal age) multigravida 35+    Prior fetal macrosomia, antepartum    Hypothyroid in pregnancy, antepartum    Short interval between pregnancies affecting pregnancy, antepartum   [2]   Past Medical History:  Diagnosis Date    Abnormal Pap smear of cervix 2020    LGSIL colpo    Herpes     Hypothyroidism     Pre-eclampsia 2024    w/o SF in labor    Varicella    [3]   Past Surgical History:  Procedure Laterality Date    COLPOSCOPY  2020    WISDOM TOOTH EXTRACTION     [4]   Current Outpatient Medications:     levothyroxine 75 mcg tablet, take 1 tablet by mouth every morning (Patient taking differently: Take 75 mcg by mouth daily Daily Monday through Friday- No medication Saturdays or Sundays), Disp: 100 tablet, Rfl: 0    Prenatal MV-Min-Fe Fum-FA-DHA (PRENATAL 1 PO), Take by mouth, Disp: , Rfl:     aspirin 81 mg chewable tablet, Chew 2 tablets (162 mg total) daily Starting at 12 weeks gestation until 36 weeks gestation (Patient not taking: Reported on 7/11/2025), Disp: 90 tablet, Rfl: 3

## 2025-07-11 NOTE — PROGRESS NOTES
Patient chose to have [facility] [id number] Non-Invasive Prenatal Screen [id number] PLUS w/ SCA, WITH fetal sex.  Patient choose to be billed through insurance.     Patient given brochure and is aware [facility] will contact patient's insurance and coordinate coverage.  Provided [facility] contact information. General inquiries [phone], Cost estimates [phone] and [facility] Billing [phone]. Website [email or web address].     Blood collection tubes labeled with patient identifiers (name, medical record number, and date of birth).     Filled out [facility] order form. Patient chose to have blood drawn in our office at time of visit. NIPS was drawn from left arm with a butterfly needle by Anel Menchaca RN. .      If patient chose to have blood work drawn at a [facility] lab we requested patient notify MFM (via phone call or People[employer] message) when blood collected so office can follow up on results.       Maternal Fetal Medicine will have results in approximately 5-7 business days and will call patient or notify via People[employer].  Patient aware viewing lab result online will reveal fetal sex if ordered.    Patient verbalized understanding of all instructions and no questions at this time.

## 2025-07-13 NOTE — PROGRESS NOTES
CONSULTATION: MATERNAL-FETAL MEDICINE  Name: Suyapa Mann      : 1990      MRN: [id number]  Encounter Provider: Prashanth Deras MD  Encounter Date: 2025   Encounter department: [facility]  :  Assessment & Plan  13 weeks gestation of pregnancy         Hypothyroidism affecting pregnancy in first trimester  Hypothyroidism is characterized by inadequate thyroid hormone production, and usually requires elevated TSH, and a low free T4 (or free T3). Total T3 and Total T4 levels will normally be elevated in pregnancy due to elevated thyroxin-binding globulin levels. Thus these labs should never be ordered in pregnancy as they will be falsely elevated.    Sub-clinical hypothyroidism requires elevated TSH but normal free T4. A large clinical trial in the [place] demonstrated that treatment of subclinical hypothyroidism or hypothyroxinemia  between 8-20 weeks of gestation did not result in significantly better cognitive outcomers in children through 5 years of age compated to no treatment. (NEJ 2017; 376:815-825)    Hashimoto's thyroiditis is the most common cause of hypothyroidism in pregnancy, and thyroid peroxidase antibodies ( also called antimicrosomal antibodies) are seen in >90% of these women. Women at high risk for hypothyroidism should be screened with TSH and free FT4.     Untreated or partially treated hypothyroidism is associated with increased risk of pre-eclampsia, abruption,  birth, low birth weight, fetal death, and long-term impaired psychomotor function.      Most women who are hypothyroid will need an increase in their thyroxine replacement dose. TSH and free T4 levels should be checked preconceptionally, at her first prenatal visit in the first trimester, 4 weeks after altering the doses, (therefore every 4 weeks until TSH is normal, especially in first 20 weeks), and at least every trimester in pregnancy. Every woman with a thyroid nodule should have  fine needle aspiration and TSH checked    Oral levothyroxine is indicated for women with overt hypothyroidism, which is associated with greater risks for fetal loss and premature birth, and for those with subclinical hypothyroidism who test positive for TPO antibodies. In the first trimester, normal range for TSH level is 0.1 to 2.5 mIU/L; this level increases to 0.2 to 3.0 mIU/L in the second trimester and 0.3 to 3.0 mIU/L in the third trimester.     Certain drugs, eg, cholestyramine, ferrous sulfate, sucralfate, and aluminum hydroxide antacids, may interfere with levothyroxine absorption from the gut. Levothyroxine administration should be spaced at least 4 hours apart from these medications. Other drugs, especially the anticonvulsants phenytoin and carbamazepine and the antituberculous agent rifampin, may accelerate levothyroxine metabolism, necessitating higher levothyroxine doses.  Orders:    Ambulatory Referral to Maternal Fetal Medicine    Supervision of elderly multigravida in first trimester  Advanced Maternal Age (AMA) is defined as maternal age 35 or greater at EDC. Advanced maternal age is associated with an increased risk of several pregnancy outcomes, including aneuploidy/genetic syndromes, poor fetal growth, stillbirth, maternal hypertensive disorders, and gestational diabetes. Despite these increased risks, many women of advanced maternal age have normal, healthy pregnancy outcomes, particularly if they have no co-existing medical conditions. Genetic counseling is available to further discuss genetic screening and testing options available in pregnancy. Risk of adverse outcomes is proportional to patient age.     Orders:    [id number] PLUS Core+SCA    Encounter for  screening for chromosomal anomalies  Screening and diagnostic tests available for fetal aneuploidy were discussed. The Sequential Screen was discussed in detail, including the sensitivity for detection of Down syndrome estimated  at 95%.      Cell-free DNA (cfDNA)  (Non invasive prenatal testing)-NIPT) screening has a 99% detection rate for Down syndrome, 96% for trisomy 18, and 91% for trisomy 13 with <1% false positive rate.Sex chromosome analysis (SCA) is an option for cell-free DNA (NIPT), to estimate risk of a sex chromosome aneuploidy. It may need to obtain approval from the insurance company.  Ms. Mann indicated wants to complete SCA analysis, fetal sex testing from NIPT.  Had a blood sample drawn in the office today    Ms Mann  declined use of definitive prenatal diagnosis, which is possible only through genetic amniocentesis or CVS.       Family history of chromosomal abnormality  History of second cousin in her side of the family and a first cousin and her partners side of the family of trisomy 21.           History of Present Illness     Suyapa is a 35 y.o.  at 13w1d presenting for consultation for aneuploidy screening, advanced maternal age, and hypothyroidism, family history of chromosomal abnormality. She reports no obstetric complaints today.    Her Antepartum course is significant for the following problems: Problem List[1] .    Past Medical History   OB History    Para Term  AB Living   2 1 1 0 0 1   SAB IAB Ectopic Multiple Live Births   0 0 0 0 1      # Outcome Date GA Lbr Erick/2nd Weight Sex Type Anes PTL Lv   2 Current            1 Term 24 40w5d / 00:45 4100 g (9 lb 0.6 oz) M Vag-Spont Local  KRISTOFER      Complications: Pre-eclampsia     For this pregnancy use the last menstrual period of April 10, 2025 to establish a gestational age as compared to earlier ultrasounds the difference is not large enough to change the due date.  Selected a due date of January 15, 2026 and confirmed a gestational age of 13 weeks and 1 day today.    Past Medical History[2]  Past Surgical History[3]    Social History:   She denies current use of alcohol, drugs of abuse, tobacco, and marijuana products.  "  Occupation:   Partner: Eliezer, is 32 years old, a , healthy, present during the visit today    Family History:  Family history was reviewed using an office screening tool, and is negative for congenital anomalies, genetic diseases, and thromboembolism in first degree relatives of this pregnancy. Family history is notable for a second cousin on her side of the family and a first cousin on her partners Eliezer's side of the family trisomy 21.    Current Medications[4]  Allergies: No Known Allergies      Objective   /64 (BP Location: Right arm, Patient Position: Sitting, Cuff Size: Standard)   Pulse 88   Ht 5' 11\" (1.803 m)   Wt 81.6 kg (180 lb)   LMP 04/10/2025 (Approximate)   SpO2 99%   BMI 25.10 kg/m²      Patient's last menstrual period was 04/10/2025 (approximate).  Estimated Delivery Date: 1/15/2026, by LMP which is consistent with the gestational age estimated by ultrasound.    Physical Examalert, well appearing, and in no distress and oriented to person, place, and time  General appearance: .  The remainder of her physical examination was deferred as she was here today for consultation and discussion.    Please refer to \"Imaging\" for ultrasound report from today's visit.     Impression:    Ms. Mann is a 35-year-old patient  2 para 1 who presents at a gestational age of 13 weeks and 1 day for ultrasound examination, please review under the epic imaging tab.  Gestational age of 13 weeks and 1 day determined by LMP with an LINDA of 1/15/2026, gestational age by LMP which is not different when compared  the gestational age established by ultrasound examination.    Plan and other recommendations:    1.  MSAFP to screen for open neural tube defects after 16 before 22 weeks gestation to be ordered and followed from your office.    2.  Early  glucose tolerance testing due to advanced maternal age and a history of a first baby born with a weight of 9 pounds 1 ounce, and if " normal to be repeated at 28 weeks gestation..    3.  Use of aspirin 162 mg daily  (81 miligram baby aspirin two tablets)   up to 36 weeks gestation is recommended. New data suggests that a dose higher than 100 mg and started before 16 weeks gestation can reduce te risk of  preeclampsia ([facility]  2017  Eliane DORMAN et al).  This recommendation is made based on advanced maternal age as well as a history of elevation in blood pressure during labor which is unclear if it was related to hypertensive disease of pregnancy.    Thank you for referring Ms. Mann to our care maternal-fetal medicine, do not hesitate to contact us if we can be of further assistance.    Prashanth Deras MD, MSCE    Maternal-fetal medicine    Administrative Statements   I have spent a total time of 30 minutes in caring for this patient on the day of the visit/encounter including Diagnostic results, Prognosis, Risks and benefits of tx options, Instructions for management, Patient and family education, Importance of tx compliance, Risk factor reductions, Impressions, Counseling / Coordination of care, Documenting in the medical record, Reviewing/placing orders in the medical record (including tests, medications, and/or procedures), Obtaining or reviewing history  , and Communicating with other healthcare professionals .         [1]   Patient Active Problem List  Diagnosis    BMI 26.0-26.9,adult    History of herpes labialis    Granulation tissue of vagina    Hypothyroidism    History of pre-eclampsia in prior pregnancy, currently pregnant    Breast engorgement    12 weeks gestation of pregnancy    AMA (advanced maternal age) multigravida 35+    Prior fetal macrosomia, antepartum    Hypothyroid in pregnancy, antepartum    Short interval between pregnancies affecting pregnancy, antepartum   [2]   Past Medical History:  Diagnosis Date    Abnormal Pap smear of cervix     LGSIL colpo    Herpes     Hypothyroidism     Pre-eclampsia      w/o SF in labor    Varicella    [3]   Past Surgical History:  Procedure Laterality Date    COLPOSCOPY  2020    WISDOM TOOTH EXTRACTION     [4]   Current Outpatient Medications:     levothyroxine 75 mcg tablet, take 1 tablet by mouth every morning (Patient taking differently: Take 75 mcg by mouth daily Daily Monday through Friday- No medication Saturdays or Sundays), Disp: 100 tablet, Rfl: 0    Prenatal MV-Min-Fe Fum-FA-DHA (PRENATAL 1 PO), Take by mouth, Disp: , Rfl:     aspirin 81 mg chewable tablet, Chew 2 tablets (162 mg total) daily Starting at 12 weeks gestation until 36 weeks gestation (Patient not taking: Reported on 7/11/2025), Disp: 90 tablet, Rfl: 3

## 2025-07-18 LAB
CFDNA.FET/CFDNA.TOTAL SFR FETUS: NORMAL %
CFDNA.FET/CFDNA.TOTAL SFR FETUS: NORMAL %
CITATION REF LAB TEST: NORMAL
FET 13+18+21+X+Y ANEUP PLAS.CFDNA: NEGATIVE
FET CHR 21 TS PLAS.CFDNA QL: NEGATIVE
FET CHR 21 TS PLAS.CFDNA QL: NEGATIVE
FET MS X RISK WBC.DNA+CFDNA QL: NOT DETECTED
FET SEX PLAS.CFDNA DOSAGE CFDNA: NORMAL
FET TS 13 RISK PLAS.CFDNA QL: NEGATIVE
FET X + Y ANEUP RISK PLAS.CFDNA SEQ-IMP: NOT DETECTED
GA EST FROM CONCEPTION DATE: NORMAL D
GESTATIONAL AGE > 9:: YES
LAB DIRECTOR NAME PROVIDER: NORMAL
LABORATORY COMMENT REPORT: NORMAL
LIMITATIONS OF THE TEST: NORMAL
NEGATIVE PREDICTIVE VALUE: NORMAL
PERFORMANCE CHARACTERISTICS: NORMAL
POSITIVE PREDICTIVE VALUE: NORMAL
REF LAB TEST METHOD: NORMAL
SL AMB NOTE:: NORMAL
TEST PERFORMANCE INFO SPEC: NORMAL

## 2025-07-21 ENCOUNTER — ROUTINE PRENATAL (OUTPATIENT)
Dept: OBGYN CLINIC | Facility: CLINIC | Age: 35
End: 2025-07-21
Payer: COMMERCIAL

## 2025-07-21 ENCOUNTER — NURSE TRIAGE (OUTPATIENT)
Age: 35
End: 2025-07-21

## 2025-07-21 VITALS
BODY MASS INDEX: 25.42 KG/M2 | HEIGHT: 71 IN | DIASTOLIC BLOOD PRESSURE: 68 MMHG | HEART RATE: 84 BPM | WEIGHT: 181.6 LBS | SYSTOLIC BLOOD PRESSURE: 102 MMHG | OXYGEN SATURATION: 99 %

## 2025-07-21 DIAGNOSIS — B37.9 YEAST INFECTION: Primary | ICD-10-CM

## 2025-07-21 PROCEDURE — 99213 OFFICE O/P EST LOW 20 MIN: CPT | Performed by: PHYSICIAN ASSISTANT

## 2025-07-21 RX ORDER — NYSTATIN AND TRIAMCINOLONE ACETONIDE 100000; 1 [USP'U]/G; MG/G
OINTMENT TOPICAL
Qty: 30 G | Refills: 0 | Status: SHIPPED | OUTPATIENT
Start: 2025-07-21

## 2025-07-21 RX ORDER — MICONAZOLE NITRATE 2 %
1 CREAM WITH APPLICATOR VAGINAL
Qty: 45 G | Refills: 0 | Status: SHIPPED | OUTPATIENT
Start: 2025-07-21

## 2025-07-21 NOTE — TELEPHONE ENCOUNTER
"REASON FOR CONVERSATION: Pregnancy Problem    SYMPTOMS: Vulvar soreness, redness, white thick discharge.    OTHER HEALTH INFORMATION: Patient is 14w4d, , calling with onset of vulvar symptoms the last several days. Patient complaining of vulvar soreness, redness with white thick discharge. Notes some odor. Denies pelvic pain or cramping, vaginal bleeding, itching, or burning with urination. Patient states this is the second time since getting pregnant that she has a vaginal infection. Asking to be seen by provider.    PROTOCOL DISPOSITION: See PCP Within 3 Days    CARE ADVICE PROVIDED: RN able to schedule patient to be seen by provider for further evaluation this afternoon. Patient agreeable. No further questions.     PRACTICE FOLLOW-UP: NA.    Reason for Disposition   [1] Symptoms of a \"yeast infection\" (i.e., itchy, white discharge, not bad smelling) AND [2] not improved > 3 days following Care Advice    Answer Assessment - Initial Assessment Questions  1. SYMPTOM: \"What's the main symptom you're concerned about?\" (e.g., rash, itching, swelling, dryness)      Soreness, redness, white thick discharge  2. LOCATION: \"Where is the  s/s located?\" (e.g., inside/outside, left/right)      External  3. ONSET: \"When did the  s/s  start?\"      Last several days  4. PAIN: \"Is there any pain?\" If Yes, ask: \"How bad is it?\" (Scale: 1-10; mild, moderate, severe)      Sore  5. CAUSE: \"What do you think is causing the symptoms?\"      Yeast vs BV  6. OTHER SYMPTOMS: \"Do you have any other symptoms?\" (e.g., fever, vaginal bleeding, pain with urination)      Denies  7. LINDA: \"What date are you expecting to deliver?\"       1/15/26  8. PREGNANCY: \"How many weeks pregnant are you?\"      14w4d    Protocols used: Pregnancy - Vulvar Symptoms-Adult-AH    "

## 2025-07-25 NOTE — PROGRESS NOTES
"Name: Suyapa Mann      : 1990      MRN: [id number]  Encounter Provider: Stephanie Hopper PA-C  Encounter Date: 2025   Encounter department: [facility] [place]  :  Assessment & Plan  Yeast infection  + yeast on wet mount. Neg BV or trichomonas. pH 4.   Recommended monistat 7 - rx sent. Aware to obtain OTC if not covered. Mycolog externally for irritating sx.   Reviewed vulvar hygiene   RTO in 1 week if sx persist, sooner as needed.     Orders:    nystatin-triamcinolone (MYCOLOG-II) ointment; Apply to irritated areas twice daily for up to 14 days.    miconazole (MONISTAT-7) 2 % vaginal cream; Insert 1 applicator into the vagina daily at bedtime        History of Present Illness   36 y/o female @ 14w4d with c/o recurrent vaginitis. Had a yeast infection at 5 weeks, which she treated with monistat 7. Sx fully resolved, then began again late last week while away at a concert/festival. Sx began shortly after IC. She and partner used new, hotel provided soap. Reports discharge, and irritation. No VB, cramping or contractions.         Review of Systems   Genitourinary:  Positive for vaginal discharge and vaginal pain. Negative for difficulty urinating, dysuria, pelvic pain and vaginal bleeding.     Medical History Reviewed by provider this encounter:  Allergies  Meds     .  Medications Ordered Prior to Encounter[1]   Social History[2]     Objective   /68 (BP Location: Left arm, Patient Position: Sitting, Cuff Size: Adult)   Pulse 84   Ht 5' 11\" (1.803 m)   Wt 82.4 kg (181 lb 9.6 oz)   LMP 04/10/2025 (Approximate)   SpO2 99%   BMI 25.33 kg/m²      + FHT    Physical Exam  Vitals and nursing note reviewed.   Constitutional:       General: She is not in acute distress.     Appearance: Normal appearance.   HENT:      Head: Normocephalic and atraumatic.     Eyes:      Conjunctiva/sclera: Conjunctivae normal.     Pulmonary:      Effort: Pulmonary " effort is normal.   Abdominal:      General: There is no distension.      Palpations: Abdomen is soft.      Tenderness: There is no abdominal tenderness.   Genitourinary:     General: Normal vulva.      Labia:         Right: No rash or lesion.         Left: No rash or lesion.       Vagina: Vaginal discharge (heterogeneous leukorrhea) present. No tenderness or bleeding.      Cervix: Normal. No cervical motion tenderness.      Comments: Gravid uterus.   Lymphadenopathy:      Lower Body: No right inguinal adenopathy. No left inguinal adenopathy.     Skin:     General: Skin is warm and dry.     Neurological:      General: No focal deficit present.      Mental Status: She is alert.      Cranial Nerves: No cranial nerve deficit.     Psychiatric:         Mood and Affect: Mood normal.         Behavior: Behavior normal.              [1]   Current Outpatient Medications on File Prior to Visit   Medication Sig Dispense Refill    aspirin 81 mg chewable tablet Chew 2 tablets (162 mg total) daily Starting at 12 weeks gestation until 36 weeks gestation 90 tablet 3    levothyroxine 75 mcg tablet take 1 tablet by mouth every morning (Patient taking differently: Take 75 mcg by mouth every morning M-, not on weekends) 100 tablet 0    Prenatal MV-Min-Fe Fum-FA-DHA (PRENATAL 1 PO) Take by mouth       No current facility-administered medications on file prior to visit.   [2]   Social History  Tobacco Use    Smoking status: Former     Current packs/day: 0.00     Types: Cigarettes     Quit date: 10/2019     Years since quittin.8    Smokeless tobacco: Never    Tobacco comments:     had smoked about 1/2 ppd since age 17-18   Vaping Use    Vaping status: Former    Quit date: 2023    Substances: Nicotine, Flavoring   Substance and Sexual Activity    Alcohol use: Not Currently     Comment: occasional    Drug use: Not Currently     Types: Marijuana     Comment: last used     Sexual activity: Yes     Partners: Male     Birth  control/protection: None

## 2025-08-07 ENCOUNTER — ROUTINE PRENATAL (OUTPATIENT)
Dept: OBGYN CLINIC | Facility: CLINIC | Age: 35
End: 2025-08-07

## 2025-08-07 VITALS — SYSTOLIC BLOOD PRESSURE: 100 MMHG | WEIGHT: 184 LBS | DIASTOLIC BLOOD PRESSURE: 70 MMHG | BODY MASS INDEX: 25.66 KG/M2

## 2025-08-07 DIAGNOSIS — O99.280 HYPOTHYROID IN PREGNANCY, ANTEPARTUM: ICD-10-CM

## 2025-08-07 DIAGNOSIS — O09.299 HISTORY OF PRE-ECLAMPSIA IN PRIOR PREGNANCY, CURRENTLY PREGNANT: ICD-10-CM

## 2025-08-07 DIAGNOSIS — Z3A.17 17 WEEKS GESTATION OF PREGNANCY: ICD-10-CM

## 2025-08-07 DIAGNOSIS — E03.9 HYPOTHYROID IN PREGNANCY, ANTEPARTUM: ICD-10-CM

## 2025-08-07 DIAGNOSIS — Z36.9 ANTENATAL SCREENING ENCOUNTER: Primary | ICD-10-CM

## 2025-08-07 PROCEDURE — PNV: Performed by: STUDENT IN AN ORGANIZED HEALTH CARE EDUCATION/TRAINING PROGRAM

## 2025-08-13 ENCOUNTER — NURSE TRIAGE (OUTPATIENT)
Age: 35
End: 2025-08-13

## 2025-08-13 ENCOUNTER — ROUTINE PRENATAL (OUTPATIENT)
Dept: OBGYN CLINIC | Facility: CLINIC | Age: 35
End: 2025-08-13
Payer: COMMERCIAL

## 2025-08-15 ENCOUNTER — APPOINTMENT (OUTPATIENT)
Dept: LAB | Facility: CLINIC | Age: 35
End: 2025-08-15
Payer: COMMERCIAL